# Patient Record
Sex: FEMALE | Race: WHITE
[De-identification: names, ages, dates, MRNs, and addresses within clinical notes are randomized per-mention and may not be internally consistent; named-entity substitution may affect disease eponyms.]

---

## 2017-08-05 ENCOUNTER — HOSPITAL ENCOUNTER (EMERGENCY)
Dept: HOSPITAL 50 - VM.ED | Age: 82
Discharge: ANOTHER HOSPITAL | End: 2017-08-05
Payer: MEDICARE

## 2017-08-05 DIAGNOSIS — G45.9: Primary | ICD-10-CM

## 2017-08-05 DIAGNOSIS — R00.1: ICD-10-CM

## 2017-08-05 LAB
CHLORIDE SERPL-SCNC: 108 MMOL/L (ref 98–109)
SODIUM SERPL-SCNC: 141 MMOL/L (ref 138–146)

## 2017-08-07 NOTE — ER
Date of Service:  08/05/2017

 

SUBJECTIVE:  Lexi presents to the emergency room by EMS.  The patient's

daughter states that the patient has been having episodes of expressive aphasia

and dysarthria for at least a week.  Her other daughter stated that she saw the

patient at approximately 10:30 in the morning, and the patient was found to have

severe dysarthria and expressive aphasia.  The patient's son had seen her at

approximately 9 o'clock that morning and was found to have normal speech pattern

at that time, so her last known well was today between 9 o'clock and 10:30.  EMS

was subsequently summoned, and the patient was transported to the emergency room

as a "green."

 

On arrival to the emergency room, the patient was alert and answering all

questions appropriately, and was not experiencing any confusion.  Her speech

patterns had resolved during transport to the ER.

 

The patient states that she was experiencing a mild headache but denied any

other focal neuro symptoms.

 

PAST MEDICAL HISTORY:  Pituitary tumor.

 

MEDICATIONS:  Please see code green documentation.

 

ALLERGIES:  NKDA.

 

REVIEW OF SYSTEMS:

General:  Denies any fever or chills.

HEENT:  No sore throat, rhinorrhea, congestion.  Does complain of mild

cephalgia.

Respiratory:  No shortness breath.

Cardiac:  Denies any substernal chest pain.  No jaw, arm, neck, or back pain.

GI:  No nausea, vomiting, or diarrhea.  No melena, hematochezia, or hematemesis.

:  Denies any dysuria.

Musculoskeletal:  No myalgias or arthralgias.

Neurologic:  No fainting, blackouts, or lightheadedness.  Again, only complaint

is that of resolved expressive aphasia and dysarthria.

 

PHYSICAL EXAMINATION:

General:  An 84-year-old female patient, who is in no acute distress.

Vital Signs:  Initial blood pressure was approximately 189/100, heart rate 45,

respiratory rate 22, she is afebrile.

Skin:  Warm, pink, and dry.

HEENT:  Eyes are PERRLA.  Extraocular movements are intact.  No funduscopic

papilledema noted.  She does have cataracts noted to the right eye.  Visual

fields are within normal limits.  Ears, TMs are clear.  Mouth, oral mucosa is

moist.  Face is symmetrical.  Eyebrow with normal eyebrow raise and smile.

Lungs:  Clear to auscultation.

Heart:  Regular rate and rhythm.

Abdomen:  Soft, nontender.  There is no hepatosplenomegaly noted.  There are no

masses noted.

Extremities:  Without edema.  She has 5/5 strength in both her upper and lower

extremities.

Neurologic:  Cranial nerves II through XII are intact.  Her speech is fluent

with no dysarthria or expressive aphasia.  Her NIH stroke scale was 0 on

repeated examination.

 

RADIOLOGIC DATA:  CT scan of the patient's brain did not reveal any acute

pathology.

 

LABORATORY DATA:  WBC is 7.6, hemoglobin is 13.7, platelets are 240.  Coags:  PT

is 11.7, INR is 1.1, and PTT is 25.0.  Chemistry:  Sodium is 141, potassium is

3.6, chloride is 108, bicarb is 23, BUN is 16, creatinine is 1.0, GFR is 53,

glucose is 106, calcium is 8.2.  Troponin is 0.0.

 

A 12-lead EKG was obtained showing a sinus bradycardia without any acute ST or T-

wave changes.

 

ASSESSMENT:

1. Transient ischemic attack.

2. Bradycardia.

 

PLAN:  I did speak with Dr. Villanueva of the hospitalist as well as Dr. Grubbs, the

neurologist, at [facility] in [place].  They advised that the patient be

transported to [facility] for further evaluation.  Certainly, she does not

meet criteria for tPA due to her resolving symptoms.  The patient will be

transported by \[facility]\"" ground ambulance.  She will require either a CTA or MRA to

determine if ischemia is the cause of

her dysarthria, however, it could certainly be secondary to the bradycardia as

well.  All questions were answered.

 

 

MWK:  08/07/2017 03:31:25  MODL:  08/07/2017 04:11:48

Job #:  [id number]

## 2017-08-07 NOTE — ER
Date of Service:  08/05/2017

 

SUBJECTIVE:  Lexi presents to the emergency room with expressive aphasia and

dysarthria.  The patient has been experiencing intermittent dysarthria since

Tuesday.  The patient's daughter states that the patient's son checked on her at

approximately 9:30 this morning and the patient was not exhibiting any abnormal

neurological signs or symptoms.  The patient's daughter subsequently came to

[place] and got to the patient's apartment approximately 10:30 this morning.

At that time the patient was experiencing tremor and expressive aphasia and

dysarthria.  The patient's daughter states that she was exhibiting "word salad."

EMS was summoned after some time.  Please refer to the patient's stroke coding

record for arrival time.

 

On arrival to the emergency room, the patient's speech had cleared and she was

no longer experiencing any abnormal verbal signs or symptoms.  The patient

states that she has been feeling poorly for the past several weeks.  She does

complain of headache.  The patient's daughter states that the patient decided to

stop taking her medication as she thought these were making her feel poorly.  It

is unknown how long she has not been taking her medications for.

 

The patient has a history of a pituitary tumor of unknown etiology.  To note, I

do not have access to [facility] to get her past medical history.

 

The patient's daughter states that the patient is not experiencing any facial

droop, difficulty with walking or weakness in her extremities.  Again EMS stated

that her only symptoms were that of an expressive aphasia and dysarthria.

 

PAST MEDICAL HISTORY:

1. Hypertension.

2. Pituitary cancer of unknown etiology.

3. Hypertension.

4. Dyslipidemia.

5. GERD.

 

MEDICATIONS:

1. Furosemide.

2. Benazepril.

3. Metoprolol.

4. Simvastatin.

5. Amlodipine.

6. Omeprazole.

 

ALLERGIES:  NKDA.

 

REVIEW OF SYSTEMS:

Please see history of present illness.

HEENT:  Complains of headache.  Denies any new visual disturbances.  She does

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

Respiratory:  No shortness of breath.

Cardiac:  Denies any substernal chest pain.  No jaw, arm, neck, or back pain.

GI:  No nausea, vomiting, or diarrhea.  No melena, hematochezia, or hematemesis.

:  Denies any dysuria.

Musculoskeletal:  No myalgias or arthralgias.

Neurologic:  Please see history of present illness.

 

PHYSICAL EXAMINATION:

General:  This is an 84-year-old female patient, who is in no acute distress.

Vital signs:  Initial blood pressure was approximately 186/100, heart rate 45,

respiratory rate is 22, she was afebrile, O2 saturation in the mid to high 90s.

Skin:  Warm, pale, and dry.

HEENT:  Ears, her right eye is opacified with what appears to be a cataract.

Ears, TMs are clear.  Mouth, oral mucosa is moist.  Visual fields are within

normal limits.

Neck:  Supple without masses.  There is no lymphadenopathy.

Chest:  No sternal or intercostal retractions noted.

Lungs:  Clear to auscultation.

Heart:  Regular rate and rhythm.

Abdomen:  Soft, nontender.  There is no hepatosplenomegaly or masses noted.

Extremities:  Without edema.

Neurologic:  Cranial nerves II through XII are intact.  Her speech is fluent.

Her gait is within normal limits.  She has no pronator drift.  Facial muscles

are symmetrical.  She has 5/5 strength in both her upper and lower extremities.

NIH stroke scale was performed and rechecked several times and was found to be

0.  A CT scan of the patient's brain was obtained and is pending.

 

LABORATORY DATA:  WBC is 7.6, hemoglobin is 13.7, platelets are 240.  Coags; PT

is 11.7, INR is 1.1, PTT is 25.0.  Sodium is 141, potassium is 3.6, chloride is

108, bicarb is 23, BUN is 16, creatinine is 1.0, GFR is 53, glucose is 106,

calcium is 8.2, troponin is 0.00.

 

EMERGENCY ROOM COURSE:  IV access has been established x2 by EMS.

 

DIAGNOSTIC DATA:  A 12-lead EKG showed a sinus rhythm without any acute ST or T-

wave abnormalities.  Again, the patient's NIH stroke scale remained 0 during her

stay in the emergency room.  She remained stable in my care with no acute

changes.

 

ASSESSMENT:  Transient ischemic attack.

 

PLAN:  The patient will be transferred to [facility] in [place].  I did speak

with the hospitalist as well as Dr. Ledesma, neurologist at [facility] who

accepted the patient in transfer.  The patient will be transported by hospitals ground

ambulance.  The patient does not meet criteria for tPA due to waxing and waning

of her symptoms since Tuesday and due to the fact that the patient's symptoms

are resolving as well as due to her history of pituitary tumor, age, and timing

of her symptoms.  I did discuss the findings with the patient and her family.

The patient identifies herself as a code level 2 with no intubation or

resuscitation.  All questions were answered.

 

 

MWK:  08/05/2017 13:37:28  MODL:  08/05/2017 14:39:09

Job #:  [id number]

## 2018-02-19 ENCOUNTER — HOSPITAL ENCOUNTER (INPATIENT)
Dept: HOSPITAL 50 - VM.MS | Age: 83
Discharge: ANOTHER HOSPITAL | DRG: 683 | End: 2018-02-19
Attending: FAMILY MEDICINE | Admitting: FAMILY MEDICINE
Payer: MEDICARE

## 2018-02-19 DIAGNOSIS — D49.7: ICD-10-CM

## 2018-02-19 DIAGNOSIS — F32.9: ICD-10-CM

## 2018-02-19 DIAGNOSIS — K22.70: ICD-10-CM

## 2018-02-19 DIAGNOSIS — I67.9: ICD-10-CM

## 2018-02-19 DIAGNOSIS — Z88.8: ICD-10-CM

## 2018-02-19 DIAGNOSIS — I12.9: ICD-10-CM

## 2018-02-19 DIAGNOSIS — E11.9: ICD-10-CM

## 2018-02-19 DIAGNOSIS — E87.5: ICD-10-CM

## 2018-02-19 DIAGNOSIS — N17.9: Primary | ICD-10-CM

## 2018-02-19 DIAGNOSIS — N18.3: ICD-10-CM

## 2018-02-19 DIAGNOSIS — E87.2: ICD-10-CM

## 2018-02-19 DIAGNOSIS — Z79.899: ICD-10-CM

## 2018-02-19 DIAGNOSIS — E86.0: ICD-10-CM

## 2018-02-19 LAB
CHLORIDE SERPL-SCNC: 108 MMOL/L (ref 98–107)
CHLORIDE SERPL-SCNC: 108 MMOL/L (ref 98–107)
SODIUM SERPL-SCNC: 134 MMOL/L (ref 136–145)
SODIUM SERPL-SCNC: 134 MMOL/L (ref 136–145)

## 2018-02-19 PROCEDURE — C9113 INJ PANTOPRAZOLE SODIUM, VIA: HCPCS

## 2018-02-19 NOTE — PCM.SN
- Free Text/Narrative


Note: 





Potassium improved to 5.7, bicarb is just being given, HCO3 is still low, Cr 

improved to 2.9, BUN 87. Still feel she needs transfer due to acidosis.

## 2018-02-19 NOTE — PCM.SN
- Free Text/Narrative


Note: 





Informed pt and family that she has BRANDY and we're giving her meds to reverse 

hyperkalemia with D 50 and Insulin, and calcium gluconate, and not use 

kayexelate per new protocol. Pt refused MRI. Will check UA and ABG's. She is ok 

to keep here, but may require transfer if K not improving.

## 2018-02-20 NOTE — DISCH
PRIMARY DIAGNOSES:

1. Acute kidney injury.

2. Metabolic acidosis.

3. Hyperkalemia.

4. Dehydration, multifactorial.

5. Chronic kidney disease stage 3.

6. Hypertension.

7. Depression.

8. Dehydration, moderate.

9. Type 2 diabetes mellitus.

10.Pituitary tumor.

11.Cerebrovascular disease.

12. Haider's esophagits.

 

SUMMARY OF ADMIT HISTORY AND PHYSICAL:  The patient is an 85-year-old female who

presented to the clinic with hypotension, nausea, not eating well.  She says for

the past several months, it has been confusing about her medications, what she

has been taking.  She had missed her appointment in February.  She had been seen

by myself in November, doing well.  She had been seen by Nephrology in January,

doing well.  The patient had probably resumed her spironolactone and doxazosin.

Also, she had stopped her Prilosec, which she had been on for Miranda

esophagitis.  She had lost 12 pounds over the last 2 months' time. Her BP was 76
/48, she was noted to be confused. Her pulse was 44, but it chronically runs 
around 50's. Her son and daughter were present. Her mood was noted to be sad 
and she scored a 27 on her PHQ 9 at the clinic. Patient chose to be DNR/DNI 
status and family heard her wishes. 

Physical exam: pt was extremely weak, sitting forward in a wheelchair,trembling 
hands. Voice weak. Mucous membranes dry. Vision poor in left and none in right 
eye. CV bradycardic, Pulm CTA, ABD Bowel sounds present, soft, mile epigastric 
tenderness. Ext: slender thin, NEURO, oreinted x 3, vague memory, PSYCH, 
Appears sad. Judgement questionable.

 

SUMMARY OF HOSPITAL COURSE:  When initially admitted, it was noted that she was

hyperkalemic at 7.7, with Cr 3.1  Her HCO3 was reduced at 16.  GFR wsa 17. Her 
Hemoglobin was 11.7. She was given normal saline 500 mg as well as D5 normal 
saline at 150 mg/hour.  She was given 10 units of Humulin R with D50 as well as 
calcium

gluconate 1 g.  She was given zofran for her nausea and starIed on IV protonix 
for concern for her Barrets esophagitis.. EKG peaked T waves with inf MI changes
, old, No ST changes. Her aspirin, benazepril, lasix, spironolactone, and 
doxazosin was held, but she had already had her medications today. 



Because of confusion, changing vision with known prior pituitary tumor, I had 
ordered an MRI of her head, but pt refused to have it done.



It was then noted that her pH was reduced at 7.17, pCO2 27, PO2

97, bicarb 10, total carbon dioxide 11, O2 saturation is 96, base excess -19.

The patient's blood pressure did improve to 124/60.  The patient will also be 
given bicarb

per protocol for low fluid intake.to help with her metabolic acidosis. Her 
potassium did improve to 5.8, Cr 2.9, BUN was 87. 



 I did visit with Dr. Robb Burns at 3 p.m at [facility] in [place] to transfer 
patient,due to ongong need for close monitoring of pt with her BRANDY and 
metabolic acidosis .and he does accept the patient; however, bed availability 
may be

a little bit prolonged and so we will see what her repeat basic metabolic 
profile is at 3

p.m. today.Her IV fluids were reduced to avoid overhydration. She was starting 
to feel better. It was felt she needed to be started on an antidepressant once 
her appetite improved. A UA was done which had bacteria, and WBC and 
epitheilial cells. A UC was set up. She was goingi to have a repeat BMP at 18:30
, if the transfer did not take place. Her care was signed out to on call 
provider, Dr Dozier. Pt did leave the facility about 18:00.



She was placed om compression hosiery for DVT prophylaxis. She was not placed 
on pharmaceutical prevention because of BRANDY and concern for risk for falls.





Over 30 minues of time was spent preparing her discharge and transfer to [place].



Her medications at transfer were her protonix 40 mg IV, Zofran 4 mg IV q 6 hrs 
prn, IV D5 NS at 75 ml/hr. 

Below were her admit meds that were held, except for her eye drops





 benazepril (LOTENSIN) 40 mg tablet TAKE 1 TABLET BY MOUTH EVERY DAY 30 tablet 
11

 

 doxazosin (CARDURA) 1 mg tablet TAKE 1 TABLET BY MOUTH EVERY NIGHT AT BEDTIME 
30 tablet 11

 

 omeprazole (PRILOSEC) 20 mg capsule TAKE 1 CAPSULE BY MOUTH ONCE DAILY 30 
capsule 11

 

 metoprolol succinate (TOPROL XL) 100 mg SR tablet (24 hr) Take 1 tablet (100 
mg) by mouth 1 time per day 90 tablet 3

 

 spironolactone (ALDACTONE) 50 mg tablet Take 1 tablet (50 mg) by mouth 1 time 
per day 30 tablet 4

 

 vitamin D2, ergocalciferol, (DRISDOL) 53707 unit capsule Take 1 capsule (50,
000 Units) by mouth 1 time a month 3 capsule 3

 

 furosemide (LASIX) 40 mg tablet Take 1 tablet (40 mg) by mouth 1 time per day 
Weigh daily 45 tablet 5

 

 acetaminophen (TYLENOL) 325 mg tablet Take 2 tablets (650 mg) by mouth every 
6 hours as needed for mild pain  0

 

 aspirin 325 mg tablet Take 1 tablet (325 mg) by mouth 1 time per day 120 
tablet 0

 

 senna-docusate sodium (SENOKOT-S;PERICOLACE) 8.6-50 MG tablet Take 1 tablet 
by mouth 2 times a day as needed for constipation 60 tablet 0

 

 bimatoprost (LUMIGAN) 0.01 % ophthalmic solution Place 1 drop into both eyes 
every night at bedtime ~  

 

 brimonidine-timolol (COMBIGAN) 0.2-0.5 % ophthalmic solution Place 1 drop 
into both eyes 2 times a day ~  

 

 brinzolamide (AZOPT) 1 % ophthalmic suspension Place 1 drop into the left eye 
2 times a day ~  

 

 sodium chloride hypertonic (TRICIA 128) 5 % ophthalmic solution Place 1 drop 
into both eyes as needed for other (Specify) (dry eyes) ~  

 

 Melatonin 10 MG TABS Take 1 tablet by mouth At bedtime as needed (sleep)  

 

 omeprazole (PRILOSEC) 20 mg capsule TAKE 1 CAPSULE BY MOUTH ONCE DAILY 90 
capsule 3







 

 

GM2018 15:02:08  MODL:  2018 04:59:24

Job #:  [id number]

MTDD

## 2018-02-20 NOTE — HP
CHIEF COMPLAINT:  Weakness and nausea.

 

HISTORY OF PRESENT ILLNESS:  The patient is an 85-year-old female, who presented

to the clinic. Family brought her in because they were concerned about her

feeling nauseated, not eating well. She has not been taking her medications

exactly  correct, I last saw her in November. She was doing quite well with her

blood pressure. She had seen Dr. Lange in January, was doing quite well,

however, family had called in December, saying that she was only eating tea and

toast. She has lost 12 pounds since December. There was always a confusion about

her medications. The family member had stopped her Prilosec to "save money." The

patient was on that for Miranda's esophagitis. She had also been taken off her

doxazosin because of problems with peripheral edema and had been resumed on

spironolactone. However, she never had her potassium level recheck and the

patient missed her appointment in early February. The patient has not been very

interested in coming to doctor cares, she feels she was a burden to others. She

has actually been feeling much more down and depressed since . She has

never been on medications for depression before. When seen in the clinic, it was

noted that her blood pressure was low at 76/48, and on , it had been

136/56. Her weight was down to 114 pounds. She is somewhat tremulous. She was

noted to be confused. She is accompanied by her son and daughter. The patient

was admitted immediately to [facility] without any chance to do blood work

before admitting her and she was given IV fluid resuscitation right away of 500

mg normal saline. The patient also was noted to have a pituitary tumor which her

eye doctor was concerned it might be growing, but she refused to have any

surgery and she refuses to have any further MRIs done of this image.

 

MEDICATIONS:  She is currently on aspirin 325 mg 1 pill a day. Benazepril 40 mg

1 pill daily. Lasix 40 mg 1 pill daily. Metoprolol extended release 100 mg 1

pill daily. Spironolactone 50 mg 1 pill daily. Doxazosin 1 mg 1 pill daily (she

has been off her Prilosec 20 mg since probably October and then simvastatin 20

mg was stopped in December because of nausea concerns). She is also on vitamin D

50,000 units 1 pill once a month that was supposed to be started on . She

takes Senokot one pill twice a day as needed. Her Lumigan 1 drop both eyes every

night at bedtime. Combigan 1 drop twice a day. Azopt 1% 1 drop in left eye twice

a day.  René 1 drop in both eyes as needed. Melatonin 10 mg at bedtime.

 

ALLERGIES:  Amlodipine, causes severe fluid retention. Simvastatin, questionably

causes nausea.

 

PAST MEDICAL HISTORY:  The patient has hypertension. She has had benign neoplasm

of her pituitary gland with previous resection of this, hypertension,

hypercholesterolemia, transient neurological deficit in 2015, unclear if it

was related to hypertension versus she had stereotypical episodes of expressive

aphasia. She had an extensive workup with MRI and MRA of her brain. Neurology

consulted, they switched her aspirin from 81 to 325 mg a day. She declined

neurology followup. She has type 2 diabetes mellitus. She has had an adenomatous

colon polyp in . She declined further colonoscopies, last asked in . She

has had Miranda's esophagitis in  by EGD, she had followup refused followups

since then noted in . She has had macular degeneration, malaise, fatigue,

and vitamin D deficiency.

 

PAST SURGICAL HISTORY:  She has had pituitary tumor resected, I am not certain

of the date.

 

FAMILY MEDICAL HISTORY:  Mother unknown history. Father is not known. Son has a

problem with some alcohol use.

 

SOCIAL HISTORY:  The patient is . She has been a housewife. She does not

smoke. She rarely consumes alcohol.

 

REVIEW OF SYSTEMS:

She has lost weight. She has tremulousness. She has very poor vision in her

eyes. No coughing. No shortness of breath. She denies nausea. She has been

somewhat sick to her stomach at times. She does walk unassisted. No numbness or

tingling in her feet. She feels sad, down, depressed, does not sleep well, does

not eat well. No skin lesions. No headaches. She has vomited. She has been a

little bit confused, forgetful. No problems with bruising or bleeding. No black

tarry stools. No diarrhea. No burning with urination. No headaches.

 

PHYSICAL EXAMINATION:

General: Reveals markedly ill-appearing female, sitting in a wheelchair, bent

over.

Vital signs: Her weight is 114, blood pressure is 76/48, temperature is 97,

pulse 48, and sats 100.

Skin: Pink, warm, and dry. Mucous membranes are extremely dry.

Eyes: Her conjunctivae are dry.

Heart: Regular rate and rhythm.

Lungs: Clear to auscultation.

Abdomen: Bowel sounds are present, soft. She has some minimal epigastric

tenderness.

Extremities: Slender and thin, 

Neruologic;she is very weak.She has no vision in her right eye. Reduced vision 
in her left. Oriented x 3. Memory is vague.

Psych: appears sad, poor eye contact.

 

IMPRESSION:

1. Acute hypotension.

2. Dehydration.

3. Confusion.

4. Chronic kidney disease.

5. Type 2 diabetes mellitus.

6. Pituitary tumor  history of.

7. Cerebrovascular disease with history of transient ischemic attack.

8. History of Miranda's esophagitis.

9. Depression.

 

PLAN:  The patient will be admitted to acute care. She will be given IV

hydration. We will watch her blood sugars. We will hold many of her blood

pressure medicines right now until she improves. We will have her on telemetry.

I did talk with the patient about considering MRI to just make certain she did

not have any further stroke like change of her head. She was not very

interested, but she consider it, but she does not want to consider any sort of

pituitary tumor.She will be given zofran for nausea and she'll be placed on IV 
protonix for her stomach.

 The patient with her mood is felt to be depressed most likely

she will need to be started on antidepressant as her PHQ score was 27. The

patient does not want to be in resuscitated or intubated, so she is DNR/DNI.

 

ADDENDUM:  After the patient was admitted, her lab work came back showing that

her white blood cell count was 7.3, hemoglobin 11.7, platelets are 165. Her

potassium was 7.7, sodium 134, creatinine 3.1, BUN 90, carbon dioxide 15,

chloride 108, glucose 120, calcium 8.7. LFTs were normal with AST 7, ALT 11,

alkaline phosphatase 85. Troponin less than 0.01. Albumin 2.9, amylase 49,

lipase was 304, which is within normal range, normal is 73 to 393. EKG showed

sinus bradycardia, then blood gases were done which came back at approximately

0230 which showed pH 7.17, pCO2 27, PO2 97, bicarb 10, total CO2 11, O2 sats 96,

base excess -19, FiO2 21. The patient refused an MRI of her head.



1. Acute kidney injury.

2. Hyperkalemia.

3. Metabolic acidosis. I did visit with family and they do concur about having

    the patient to be transferred to a higher level of care and arrangements to

    be made. To note, the patient was not placed on Lovenox because of risk of

    falls as well as not knowing her renal status at the time of initial

    admission. The patient does agree to go.



Initial medications of 500 normal saline, D5 normal saline running at 150

mg/hour. She had been then given 10 units of Humulin R as well as an ampule of

D50 and calcium gluconate 1 g to help with her hyperkalemia. She had already had

her medications today, but her benazepril will be held and her doxazosin will be

held and spironolactone will be held, the intent was that she would be started

on Prozac 10 mg tomorrow. A urinalysis will also be done.

 

 

GM2018 14:53:45  MODL:  2018 21:04:44

Job #:  [id number]

MTDD

## 2019-02-13 ENCOUNTER — HOSPITAL ENCOUNTER (EMERGENCY)
Dept: HOSPITAL 50 - VM.ED | Age: 84
Discharge: ANOTHER HOSPITAL | End: 2019-02-13
Payer: MEDICARE

## 2019-02-13 DIAGNOSIS — I10: ICD-10-CM

## 2019-02-13 DIAGNOSIS — Z79.82: ICD-10-CM

## 2019-02-13 DIAGNOSIS — S72.141A: Primary | ICD-10-CM
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
## 2019-02-13 DIAGNOSIS — E11.9: ICD-10-CM

## 2019-02-13 DIAGNOSIS — Z88.8: ICD-10-CM

## 2019-02-13 LAB
ANION GAP SERPL CALC-SCNC: 18.4 MMOL/L (ref 10–20)
CHLORIDE SERPL-SCNC: 106 MMOL/L (ref 98–107)
SODIUM SERPL-SCNC: 143 MMOL/L (ref 136–145)

## 2019-02-13 PROCEDURE — 85610 PROTHROMBIN TIME: CPT

## 2019-02-13 PROCEDURE — 85025 COMPLETE CBC W/AUTO DIFF WBC: CPT

## 2019-02-13 PROCEDURE — 84484 ASSAY OF TROPONIN QUANT: CPT

## 2019-02-13 PROCEDURE — 99285 EMERGENCY DEPT VISIT HI MDM: CPT

## 2019-02-13 PROCEDURE — 80053 COMPREHEN METABOLIC PANEL: CPT

## 2019-02-13 PROCEDURE — 96376 TX/PRO/DX INJ SAME DRUG ADON: CPT

## 2019-02-13 PROCEDURE — 36415 COLL VENOUS BLD VENIPUNCTURE: CPT

## 2019-02-13 PROCEDURE — 72192 CT PELVIS W/O DYE: CPT

## 2019-02-13 PROCEDURE — 73030 X-RAY EXAM OF SHOULDER: CPT

## 2019-02-13 PROCEDURE — 96361 HYDRATE IV INFUSION ADD-ON: CPT

## 2019-02-13 PROCEDURE — 96374 THER/PROPH/DIAG INJ IV PUSH: CPT

## 2019-02-13 NOTE — CT
______________________________________________________________________________   

  

[id number] CT/CT Pelvis WO IV  

Exam:   

   

 CT Pelvis WO IV  

   

 Clinical Data:   

   

 TRAUMA  

   

 COMPARISON:   

   

 NO PREVIOUS SIMILAR EXAMS ARE AVAILABLE  

   

 FINDINGS:   

   

 There is a small hematoma in the right iliac fossa.  

   

 There is a comminuted intertrochanteric fracture of the right hip.  

   

 The pubic rami appear to be intact.  

   

 IV contrast was not used.  

   

 There is avulsion of the lesser and greater trochanters of the right hip.  

   

 The sacroiliac joints and sacrum appear to be intact.  

   

 There are degenerative changes of the lower lumbar spine.  

   

 There are diffuse atheromatous calcifications.   

   

 IMPRESSION:  

   

 COMPLEX RIGHT SIDE HIP FRACTURE.  

   

 RIGHT ILIAC FOSSA HEMATOMA SUGGESTING HEMORRHAGE   

   

 RELATED TO THE RIGHT HIP FRACTURE.  

   

 NO OBVIOUS PELVIC FRACTURE SEEN.  

   

 SLIGHT DIASTASES OF THE RIGHT SI JOINT MAY BE COEXISTING TO  

   

 EXPLAIN THE RIGHT SIDE PELVIC HEMORRHAGE.  

   

 FOLLOW-UP WITH IV CONTRAST MAY BE HELPFUL.  

   

 Electronically signed by John Young MD on 2/13/2019 8:47 PM  

   

  

John Young MD                 

 02/13/19 9733    

  

Thank you for allowing us to participate in the care of your patient.

## 2019-02-13 NOTE — EDM.PDOC
ED HPI GENERAL MEDICAL PROBLEM





- General


Chief Complaint: Lower Extremity Injury/Pain


Stated Complaint: Right hip pain, Left shoulder pain, fall


Time Seen by Provider: 02/13/19 19:38


Source of Information: Reports: Patient, EMS


History Limitations: Reports: No Limitations





- History of Present Illness


INITIAL COMMENTS - FREE TEXT/NARRATIVE: 


Patient lives in her own apartment and EMS was called out for a fall with right 

hip pain as well as left shoulder pain.  She denies any head injury, LOC, chest 

pain, shortness of breath.  Denies abdominal pain, denies blood in urine or 

stool.  No nausea or vomiting.  She does have bruising to her left shoulder.  

She tells us she has been falling at home.  


Onset: Today, Sudden


Location: Reports: Upper Extremity, Left, Lower Extremity, Right


Quality: Reports: Ache, Sharp


Severity: Moderate


Worsens with: Reports: Movement


Associated Symptoms: Reports: No Other Symptoms


  ** Right hip


Pain Score (Numeric/FACES): 10





- Related Data


 Allergies











Allergy/AdvReac Type Severity Reaction Status Date / Time


 


amlodipine Allergy  Other Verified 02/13/19 20:45


 


simvastatin Allergy  Other Verified 02/13/19 20:45











Home Meds: 


 Home Meds





Acetaminophen [Tylenol] 650 mg PO Q4H PRN  tablet 02/19/18 [Rx]


Acetaminophen [Tylenol] 650 mg PO Q6H PRN 02/19/18 [History]


Aspirin [Ecotrin] 325 mg PO DAILY 02/19/18 [History]


Benazepril [Lotensin] 40 mg PO DAILY 02/19/18 [History]


Bimatoprost [LUMIGAN 0.01% Ophth Soln] 1 drop EYEBOTH BEDTIME 02/19/18 [History]


Brimonidine Tartrate/Timolol [Combigan 0.2%-0.5% Eye Drops] 1 drop EYEBOTH 

BEDTIME 02/19/18 [History]


Brinzolamide [Azopt 1% Ophth Susp] 1 drop EYELF DAILY 02/19/18 [History]


Dextrose 10% in Water 500 ml IV ASDIRECTED  bag 02/19/18 [Rx]


Dextrose 5%-0.9% NaCl [Dextrose 5%-Normal Saline] 150 ml IV ASDIRECTED  bag 02/ 19/18 [Rx]


Doxazosin Mesylate [Cardura] 1 mg PO BEDTIME 02/19/18 [History]


Ergocalciferol (Vitamin D2) [Vitamin D2] 1 cap PO Q30D 02/19/18 [History]


Furosemide [Lasix] 40 mg PO DAILY 02/19/18 [History]


Melatonin 10 mg PO BEDTIME PRN 02/19/18 [History]


Metoprolol Succinate [Toprol XL 100mg] 100 mg PO DAILY 02/19/18 [History]


Omeprazole [priLOSEC OTC] 20 mg PO DAILY 02/19/18 [History]


Ondansetron [Zofran] 4 mg IVPUSH Q6H PRN  vial 02/19/18 [Rx]


Pantoprazole [ProTONIX IV***] 40 mg IVPUSH DAILY  vial 02/19/18 [Rx]


Sennosides/Docusate Sodium [Senna-S Tablet] 1 each PO BID PRN 02/19/18 [History]


Sodium Chloride 5% [Robert 128 5% Ophth Soln] 1 drop EYELF DAILY PRN 02/19/18 [

History]


Spironolactone [Aldactone] 50 mg PO DAILY 02/19/18 [History]











Past Medical History


HEENT History: Reports: Macular Degeneration


Cardiovascular History: Reports: High Cholesterol, Hypertension


Other Neuro History: transient neurologic deficit


Endocrine/Metabolic History: Reports: Diabetes, Type II, Vitamin D Deficiency





Social & Family History





- Family History


Family Medical History: Noncontributory





Review of Systems





- Review of Systems


Review Of Systems: See Below


Constitutional: Reports: No Symptoms


Eyes: Reports: No Symptoms


Ears: Reports: No Symptoms


Nose: Reports: No Symptoms


Mouth/Throat: Reports: No Symptoms


Respiratory: Reports: No Symptoms


Cardiovascular: Reports: No Symptoms


GI/Abdominal: Reports: No Symptoms


Genitourinary: Reports: No Symptoms


Musculoskeletal: Reports: Shoulder Pain (left), Leg Pain (right hip)


Skin: Reports: Bruising (bruising in multiple stages of healing to shoulder, 

arms, legs)


Neurological: Reports: No Symptoms


Psychiatric: Reports: No Symptoms





ED EXAM, GENERAL





- Physical Exam


Exam: See Below


Exam Limited By: No Limitations


General Appearance: Alert, WD/WN, Mild Distress


Eye Exam: Bilateral Eye: EOMI


Ears: Normal TMs


Nose: Normal Inspection, Normal Mucosa, No Blood


Throat/Mouth: Normal Inspection, Normal Lips, Normal Teeth, Normal Gums, Normal 

Oropharynx, Normal Voice, No Airway Compromise


Head: Atraumatic, Normocephalic


Neck: Normal Inspection, Supple, Non-Tender, Full Range of Motion


Respiratory/Chest: No Respiratory Distress, Lungs Clear, Normal Breath Sounds, 

No Accessory Muscle Use, Chest Non-Tender


Cardiovascular: Normal Peripheral Pulses, Regular Rate, Rhythm, No Edema, No 

Gallop, No JVD, No Murmur, No Rub


Peripheral Pulses: 2+: Posterior Tibial (L), Posterior Tibial (R), Dorsalis 

Pedis (L), Dorsalis Pedis (R)


GI/Abdominal: Normal Bowel Sounds, Soft, Non-Tender, No Organomegaly, No 

Distention, No Abnormal Bruit, No Mass


Back Exam: Normal Inspection, Full Range of Motion, NT


Extremities: Leg Pain (right hip shortening, rotation.  Pain with movement, 

internal and external rotation), Limited Range of Motion.  No: Normal Inspection


Neurological: Alert, Oriented, CN II-XII Intact, Normal Cognition, Normal Gait, 

Normal Reflexes, No Motor/Sensory Deficits


Psychiatric: Normal Affect, Normal Mood


Skin Exam: Ecchymosis (left shoulder, left leg, right leg, arms bilateral)





Course





- Vital Signs


Last Recorded V/S: 


 Last Vital Signs











Temp  37.7 C   02/13/19 19:30


 


Pulse  100   02/13/19 19:30


 


Resp  16   02/13/19 19:30


 


BP  176/82 H  02/13/19 19:30


 


Pulse Ox  98   02/13/19 19:30














- Orders/Labs/Meds


Orders: 


 Active Orders 24 hr











 Category Date Time Status


 


 Sodium Chloride 0.9% [Normal Saline] 1,000 ml Med  02/13/19 19:45 Ordered





 IV ASDIRECTED   


 


 Sodium Chloride 0.9% [Saline Flush] Med  02/13/19 19:42 Ordered





 10 ml FLUSH ASDIRECTED PRN   


 


 Saline Lock Insert [OM.PC] Routine Oth  02/13/19 19:42 Ordered








 Medication Orders





Sodium Chloride (Normal Saline)  1,000 mls @ 200 mls/hr IV ASDIRECTED ARNOLD


   Last Admin: 02/13/19 20:30  Dose: 200 mls/hr


Sodium Chloride (Saline Flush)  10 ml FLUSH ASDIRECTED PRN


   PRN Reason: Keep Vein Open








Labs: 


 Laboratory Tests











  02/13/19 02/13/19 02/13/19 Range/Units





  20:19 20:19 20:19 


 


WBC  14.5 H    (4.0-10.0)  x10^3/uL


 


RBC  3.48 L    (4.00-5.50)  x10^6/uL


 


Hgb  10.4 L    (12.0-16.0)  g/dL


 


Hct  31.9 L    (33.0-47.0)  %


 


MCV  91.7    (78.0-93.0)  fL


 


MCH  29.9    (26.0-32.0)  pg


 


MCHC  32.6    (32.0-36.0)  g/dL


 


RDW Coeff of Raegan  14.7    (10.0-15.0)  %


 


Plt Count  217    (130-400)  x10^3/uL


 


Neut % (Auto)  84.1 H    (50.0-80.0)  %


 


Lymph % (Auto)  8.4 L    (25.0-50.0)  %


 


Mono % (Auto)  7.3    (2.0-11.0)  %


 


Eos % (Auto)  0.0    (0.0-4.0)  %


 


Baso % (Auto)  0.2    (0.2-1.2)  %


 


PT   12.7 H   (9.6-11.4)  SEC


 


INR   1.2 L   (2.0-3.5)  


 


Sodium    143  (136-145)  mmol/L


 


Potassium    4.4  (3.5-5.1)  mmol/L


 


Chloride    106  ()  mmol/L


 


Carbon Dioxide    23  (21-32)  mmol/L


 


Anion Gap    18.4  (10-20)  mmol/L


 


BUN    29 H D  (7-18)  mg/dL


 


Creatinine    1.4 H D  (0.55-1.02)  mg/dL


 


Est Cr Clr Drug Dosing    TNP  


 


Estimated GFR (MDRD)    36  


 


Glucose    180 H  ()  mg/dL


 


Calcium    9.2  (8.5-10.1)  mg/dL


 


Corrected Calcium    9.76  (8.5-10.1)  mg/dL


 


Total Bilirubin    0.7  (0.2-1.0)  mg/dL


 


AST    22  (15-37)  U/L


 


ALT    18  (14-59)  U/L


 


Alkaline Phosphatase    100  ()  U/L


 


Troponin I    < 0.017  (<=0.056)  ng/mL


 


Total Protein    6.5  (6.4-8.2)  g/dL


 


Albumin    3.3 L  (3.4-5.0)  g/dL


 


Globulin    3.2  


 


Albumin/Globulin Ratio    1.03  











Meds: 


Medications











Generic Name Dose Route Start Last Admin





  Trade Name Freremigio  PRN Reason Stop Dose Admin


 


Sodium Chloride  1,000 mls @ 200 mls/hr  02/13/19 19:45  02/13/19 20:30





  Normal Saline  IV   200 mls/hr





  ASDIRECTED ARNOLD   Administration





     





     





     





     


 


Sodium Chloride  10 ml  02/13/19 19:42  





  Saline Flush  FLUSH   





  ASDIRECTED PRN   





  Keep Vein Open   





     





     





     














Discontinued Medications














Generic Name Dose Route Start Last Admin





  Trade Name Freq  PRN Reason Stop Dose Admin


 


Hydromorphone HCl  0.5 mg  02/13/19 19:53  02/13/19 20:15





  Dilaudid  IVPUSH  02/13/19 19:54  0.5 mg





  ONETIME ONE   Administration





     





     





     





     


 


Hydromorphone HCl  0.5 mg  02/13/19 21:27  02/13/19 21:30





  Dilaudid  IVPUSH  02/13/19 21:28  0.5 mg





  ONETIME ONE   Administration





     





     





     





     














- Radiology Interpretation


Free Text/Narrative:: 


Shoulder x-ray negative for any fractures





CT pelvis shows Comminuted intertrochanteric fracture of the right hip





Departure





- Departure


Time of Disposition: 21:40


Disposition: DC/[facility]fer to [facility]


Condition: Fair


Clinical Impression: 


 Closed right hip fracture








- Discharge Information


Referrals: 


Kassandra Guevara MD [Primary Care Provider] - 


Forms:  ED Department Discharge, Interfacility Transfer [facility]





ED Communication





- ED Communication Date/Time


Date: 02/13/19


Time Called: 21:00





- Discussed Case With (1)


Discussed Case With (1): Admitting Provider (Dr. Kirby hospitalist given report 

and did accept patient.  Await transfer)





- My Orders


Last 24 Hours: 


My Active Orders





02/13/19 19:42


Sodium Chloride 0.9% [Saline Flush]   10 ml FLUSH ASDIRECTED PRN 


Saline Lock Insert [OM.PC] Routine 





02/13/19 19:45


Sodium Chloride 0.9% [Normal Saline] 1,000 ml IV ASDIRECTED 














- Assessment/Plan


Last 24 Hours: 


My Active Orders





02/13/19 19:42


Sodium Chloride 0.9% [Saline Flush]   10 ml FLUSH ASDIRECTED PRN 


Saline Lock Insert [OM.PC] Routine 





02/13/19 19:45


Sodium Chloride 0.9% [Normal Saline] 1,000 ml IV ASDIRECTED

## 2019-02-13 NOTE — CR
______________________________________________________________________________   

  

[id number] RAD/RAD Shoulder Left 2V Min  

EXAM:   

   

 RAD Shoulder Left 2V Min  

   

 CLINICAL DATA:   

   

 TRAUMA  

   

 COMPARISON:   

   

 NO PREVIOUS SIMILAR EXAM IS AVAILABLE.  

   

 FINDINGS:   

   

 No fracture or dislocation is seen.  

   

 There is no radiopaque foreign body in the soft tissues.  

   

 There is no air in the soft tissues.  

   

 There is no cortical thickening or periosteal reaction either.  

   

 IMPRESSION:  

   

 NEGATIVE PLAIN FILM EXAM.  

   

 Electronically signed by John Young MD on 2/13/2019 8:48 PM  

   

  

John Young MD                 

 02/13/19 1109    

  

Thank you for allowing us to participate in the care of your patient.

## 2019-04-05 ENCOUNTER — HOSPITAL ENCOUNTER (OUTPATIENT)
Dept: HOSPITAL 50 - VM.ED | Age: 84
Setting detail: OBSERVATION
LOS: 1 days | Discharge: NURSING, REHAB OR LONG-TERM CARE | End: 2019-04-06
Attending: NURSE PRACTITIONER | Admitting: NURSE PRACTITIONER
Payer: MEDICARE

## 2019-04-05 DIAGNOSIS — E78.00: ICD-10-CM

## 2019-04-05 DIAGNOSIS — I10: ICD-10-CM

## 2019-04-05 DIAGNOSIS — Z88.8: ICD-10-CM

## 2019-04-05 DIAGNOSIS — E11.9: ICD-10-CM

## 2019-04-05 DIAGNOSIS — R11.2: ICD-10-CM

## 2019-04-05 DIAGNOSIS — E86.0: Primary | ICD-10-CM

## 2019-04-05 DIAGNOSIS — Z79.899: ICD-10-CM

## 2019-04-05 LAB
ANION GAP SERPL CALC-SCNC: 19.7 MMOL/L (ref 10–20)
CHLORIDE SERPL-SCNC: 99 MMOL/L (ref 98–107)
SODIUM SERPL-SCNC: 132 MMOL/L (ref 136–145)

## 2019-04-05 NOTE — EDM.PDOC
ED HPI GENERAL MEDICAL PROBLEM





- General


Chief Complaint: General


Stated Complaint: nausea 


Time Seen by Provider: 04/05/19 15:20


Source of Information: Reports: Patient, EMS, EMS Notes Reviewed, Family, RN


History Limitations: Reports: Other





- History of Present Illness


INITIAL COMMENTS - FREE TEXT/NARRATIVE: 





Patient comes into the emergency department with weakness, nausea and abnormal 

labs results drawn in the clinic. Patient was brought in by EMS after receiving 

critical labs values to the nursing home. Patient was recently started on 

antidepressant approximate 5 days ago. She was also started on Bactrim 5 days 

ago. The reason for the Bactrim was she had UTI symptoms. Family state that 

after starting both of those prescriptions she started having nausea and 

vomiting has not been able to keep any food down for the most part over the 

course the last 4-5 days. Patient is also experiencing increased weakness and 

fatigue. Pt denies any pain. Family can not think of anything that makes it 

better or worse.  


Onset: Gradual


Severity: Moderate


Improves with: Reports: None


Worsens with: Reports: None


Associated Symptoms: Reports: Loss of Appetite, Malaise, Nausea/Vomiting, 

Weakness





- Related Data


 Allergies











Allergy/AdvReac Type Severity Reaction Status Date / Time


 


amlodipine Allergy  Other Verified 04/05/19 17:48


 


simvastatin Allergy  Other Verified 04/05/19 17:48











Home Meds: 


 Home Meds





Acetaminophen [Tylenol] 650 mg PO Q4H PRN  tablet 02/19/18 [Rx]


Acetaminophen [Tylenol] 650 mg PO Q6H PRN 02/19/18 [History]


Aspirin [Ecotrin] 325 mg PO DAILY 02/19/18 [History]


Benazepril [Lotensin] 40 mg PO DAILY 02/19/18 [History]


Bimatoprost [LUMIGAN 0.01% Ophth Soln] 1 drop EYEBOTH BEDTIME 02/19/18 [History]


Brimonidine Tartrate/Timolol [Combigan 0.2%-0.5% Eye Drops] 1 drop EYEBOTH 

BEDTIME 02/19/18 [History]


Brinzolamide [Azopt 1% Ophth Susp] 1 drop EYELF DAILY 02/19/18 [History]


Dextrose 10% in Water 500 ml IV ASDIRECTED  bag 02/19/18 [Rx]


Dextrose 5%-0.9% NaCl [Dextrose 5%-Normal Saline] 150 ml IV ASDIRECTED  bag 02/ 19/18 [Rx]


Doxazosin Mesylate [Cardura] 1 mg PO BEDTIME 02/19/18 [History]


Ergocalciferol (Vitamin D2) [Vitamin D2] 1 cap PO Q30D 02/19/18 [History]


Furosemide [Lasix] 40 mg PO DAILY 02/19/18 [History]


Melatonin 10 mg PO BEDTIME PRN 02/19/18 [History]


Metoprolol Succinate [Toprol XL 100mg] 100 mg PO DAILY 02/19/18 [History]


Omeprazole [priLOSEC OTC] 20 mg PO DAILY 02/19/18 [History]


Ondansetron [Zofran] 4 mg IVPUSH Q6H PRN  vial 02/19/18 [Rx]


Pantoprazole [ProTONIX IV***] 40 mg IVPUSH DAILY  vial 02/19/18 [Rx]


Sennosides/Docusate Sodium [Senna-S Tablet] 1 each PO BID PRN 02/19/18 [History]


Sodium Chloride 5% [Robert 128 5% Ophth Soln] 1 drop EYELF DAILY PRN 02/19/18 [

History]


Spironolactone [Aldactone] 50 mg PO DAILY 02/19/18 [History]











Past Medical History


HEENT History: Reports: Macular Degeneration


Cardiovascular History: Reports: High Cholesterol, Hypertension


Other Neuro History: transient neurologic deficit


Endocrine/Metabolic History: Reports: Diabetes, Type II, Vitamin D Deficiency





Social & Family History





- Family History


Family Medical History: Noncontributory





ED ROS GENERAL





- Review of Systems


Review Of Systems: See Below


Constitutional: Reports: Malaise, Weakness, Fatigue, Decreased Appetite


HEENT: Reports: No Symptoms


Respiratory: Reports: No Symptoms


Cardiovascular: Reports: No Symptoms


Endocrine: Reports: No Symptoms


: Reports: Frequency, Hematuria, Urgency


Musculoskeletal: Reports: No Symptoms


Skin: Reports: No Symptoms


Neurological: Reports: No Symptoms





ED EXAM, GENERAL





- Physical Exam


Exam: See Below


Exam Limited By: No Limitations


General Appearance: Alert, WD/WN, No Apparent Distress


Respiratory/Chest: No Respiratory Distress, Lungs Clear, Normal Breath Sounds, 

No Accessory Muscle Use, Chest Non-Tender


GI/Abdominal: Normal Bowel Sounds, Soft, Non-Tender, No Distention


Back Exam: Normal Inspection, Full Range of Motion


Extremities: Normal Inspection, Normal Range of Motion, Non-Tender, No Pedal 

Edema, Normal Capillary Refill


Neurological: Alert, Oriented


Skin Exam: Warm, Dry, Intact, Pallor





Course





- Orders/Labs/Meds


Orders: 


 Active Orders 24 hr











 Category Date Time Status


 


 Cardiac Monitoring [RC] .AS DIRECTED Care  04/05/19 15:29 Active


 


 EKG Documentation Completion [RC] STAT Care  04/05/19 15:28 Active


 


 Sodium Chloride 0.9% [Normal Saline] 1,000 ml Med  04/05/19 15:29 Active





 IV ONETIME   


 


 Sodium Chloride 0.9% [Saline Flush] Med  04/05/19 15:28 Active





 10 ml FLUSH ASDIRECTED PRN   


 


 Peripheral IV Insertion Adult [OM.PC] Stat Oth  04/05/19 15:28 Ordered








 Medication Orders





Sodium Chloride (Normal Saline)  1,000 mls @ 250 mls/hr IV ONETIME ONE


   Stop: 04/05/19 19:28


   Last Admin: 04/05/19 15:39  Dose: 250 mls/hr


Sodium Chloride (Saline Flush)  10 ml FLUSH ASDIRECTED PRN


   PRN Reason: Keep Vein Open








Labs: 


 Laboratory Tests











  04/05/19 04/05/19 Range/Units





  15:30 15:30 


 


WBC  10.9 H   (4.0-10.0)  x10^3/uL


 


RBC  3.76 L   (4.00-5.50)  x10^6/uL


 


Hgb  11.0 L   (12.0-16.0)  g/dL


 


Hct  34.3   (33.0-47.0)  %


 


MCV  91.2   (78.0-93.0)  fL


 


MCH  29.3   (26.0-32.0)  pg


 


MCHC  32.1   (32.0-36.0)  g/dL


 


RDW Coeff of Raegan  18.8 H   (10.0-15.0)  %


 


Plt Count  244   (130-400)  x10^3/uL


 


Add Manual Diff  Yes   


 


Neutrophils % (Manual)  64   (50-80)  %


 


Lymphocytes % (Manual)  14 L   (25-50)  %


 


Reactive Lymphs %  10 H   (0)  %


 


Monocytes % (Manual)  10   (2-11)  %


 


Eosinophils % (Manual)  2   (0-4)  %


 


Vacuolated Monocytes  Rare   


 


Platelet Estimate  Adequate   


 


Anisocytosis  1+ slight H   


 


Ovalocytes  1+ slight H   


 


Rouleaux  1+ slight H   


 


Sodium   132 L D  (136-145)  mmol/L


 


Potassium   5.7 H  (3.5-5.1)  mmol/L


 


Chloride   99  ()  mmol/L


 


Carbon Dioxide   19 L  (21-32)  mmol/L


 


Anion Gap   19.7  (10-20)  mmol/L


 


BUN   63 H D  (7-18)  mg/dL


 


Creatinine   3.1 H* D  (0.55-1.02)  mg/dL


 


Est Cr Clr Drug Dosing   TNP  


 


Estimated GFR (MDRD)   14  


 


Glucose   131 H  ()  mg/dL


 


Calcium   9.7  (8.5-10.1)  mg/dL


 


Corrected Calcium   10.10  (8.5-10.1)  mg/dL


 


Total Bilirubin   0.3  (0.2-1.0)  mg/dL


 


AST   13 L  (15-37)  U/L


 


ALT   17  (14-59)  U/L


 


Alkaline Phosphatase   115  ()  U/L


 


NT-Pro-B Natriuret Pep   565 H  (<=450)  pg/mL


 


Total Protein   7.1  (6.4-8.2)  g/dL


 


Albumin   3.5  (3.4-5.0)  g/dL


 


Globulin   3.6  


 


Albumin/Globulin Ratio   0.97  











Meds: 


Medications











Generic Name Dose Route Start Last Admin





  Trade Name Freq  PRN Reason Stop Dose Admin


 


Sodium Chloride  1,000 mls @ 250 mls/hr  04/05/19 15:29  04/05/19 15:39





  Normal Saline  IV  04/05/19 19:28  250 mls/hr





  ONETIME ONE   Administration





     





     





     





     


 


Sodium Chloride  10 ml  04/05/19 15:28  





  Saline Flush  FLUSH   





  ASDIRECTED PRN   





  Keep Vein Open   





     





     





     














Departure





- Departure


Time of Disposition: 17:45


Disposition: Refer to Observation


Condition: Good


Clinical Impression: 


 Elevated serum creatinine, Dehydration





Nausea and vomiting


Qualifiers:


 Vomiting type: unspecified Vomiting Intractability: non-intractable Qualified 

Code(s): R11.2 - Nausea with vomiting, unspecified








- Discharge Information


*PRESCRIPTION DRUG MONITORING PROGRAM REVIEWED*: Not Applicable


*COPY OF PRESCRIPTION DRUG MONITORING REPORT IN PATIENT JANES: Not Applicable


Forms:  ED Department Discharge





- Problem List Review


Problem List Initiated/Reviewed/Updated: Yes





- My Orders


Last 24 Hours: 


My Active Orders





04/05/19 15:28


EKG Documentation Completion [RC] STAT 


Sodium Chloride 0.9% [Saline Flush]   10 ml FLUSH ASDIRECTED PRN 


Peripheral IV Insertion Adult [OM.PC] Stat 





04/05/19 15:29


Cardiac Monitoring [RC] .AS DIRECTED 


Sodium Chloride 0.9% [Normal Saline] 1,000 ml IV ONETIME 














- Assessment/Plan


Admission H&P: Please use this note as an admission H&P


Last 24 Hours: 


My Active Orders





04/05/19 15:28


EKG Documentation Completion [RC] STAT 


Sodium Chloride 0.9% [Saline Flush]   10 ml FLUSH ASDIRECTED PRN 


Peripheral IV Insertion Adult [OM.PC] Stat 





04/05/19 15:29


Cardiac Monitoring [RC] .AS DIRECTED 


Sodium Chloride 0.9% [Normal Saline] 1,000 ml IV ONETIME 











Assessment:: 





1. nausea and vomiting 


2. dehydration 


3. elevated creatinine 


4. Medication reaction 


Plan: 





1. Labs completed in ER. results discussed with the pt and family  


2. Fluid bolus given in ER 


3. CT scan ordered- negative results. results discussed with the pt and family  


4. Pt had a UA completed early in the week did not repeat in the ER. She no 

longer has symptoms.  


5. Pt does not wish to go to [place]. She would like to be admitted and monitor 

over night. 


6. Pt is feeling better with fluids. Denies any nausea


7. Pt will be admitted for observation and will be given IV fluids over night 

and recheck labs in the am


8. Pt will be given a dose of Rocephin 1gm IV and Bactrim will be stopped for 

intolerance


9. Labs will be re-evaluated in the am.

## 2019-04-05 NOTE — CT
______________________________________________________________________________   

  

[id number] CT/CT Abdomen Pelvis WO IV  

EXAM: CT Abdomen Pelvis WO IV  

   

 CLINICAL DATA: ELEVATED CR AND UTI.  

   

 COMPARISON STUDY: None.  

   

 FINDINGS:  

   

 Coronary artery disease.   

   

 Liver, spleen, gallbladder, pancreas, and adrenal glands are unremarkable.  

   

 The kidneys are mildly atrophic. No renal calculi. No hydronephrosis or  

 hydroureter. 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  

   

  No bowel obstruction or inflammation.   

   

 No lymphadenopathy, free fluid, or pneumoperitoneum.   

   

 Atherosclerotic calcifications of the aorta and its branches. Scattered changes  

 of spondylosis the spine. No fracture or osseous lesion. Postsurgical changes of  

 the right femur.  

   

 IMPRESSION:  

   

 No evidence of obstructing stone within the urinary tract bilaterally.  

   

 Electronically signed by Nam Coe MD on 4/5/2019 5:31 PM  

   

  

Nam Coe DO                 

 04/05/19 1733    

  

Thank you for allowing us to participate in the care of your patient.

## 2019-04-06 LAB
ANION GAP SERPL CALC-SCNC: 17.3 MMOL/L (ref 10–20)
CHLORIDE SERPL-SCNC: 109 MMOL/L (ref 98–107)
SODIUM SERPL-SCNC: 142 MMOL/L (ref 136–145)

## 2019-04-06 NOTE — PCM.DCSUM1
**Discharge Summary





- Hospital Course


Free Text/Narrative:: 





ER HPI:


Patient comes into the emergency department with weakness, nausea and abnormal 

labs results drawn in the clinic. Patient was brought in by EMS after receiving 

critical labs values to the nursing home. Patient was recently started on 

antidepressant approximate 5 days ago. She was also started on Bactrim 5 days 

ago. The reason for the Bactrim was she had UTI symptoms. Family state that 

after starting both of those prescriptions she started having nausea and 

vomiting has not been able to keep any food down for the most part over the 

course the last 4-5 days. Patient is also experiencing increased weakness and 

fatigue. Pt denies any pain. Family can not think of anything that makes it 

better or worse.


Brief History: Over the course of the night the patient had no concerns or 

complications. Patient was up walking, had her IV fluids, and ate a full 

supper. Patient has been up with assistance to the bathroom multiple times with 

staph. She states that she feels better. She denies any pain, nausea, chest pain

, shortness of breath, dizziness, lightheadedness or edema. Patient feels that 

she is back to her baseline and would like to return to the nursing home. Vital 

signs are been reviewed and all show within patient's somnolence. Lab results 

completed this morning show her creatinine level is returning closer to her 

baseline.


Diagnosis: Stroke: No





- Discharge Data


Discharge Date: 04/06/19


Discharge Disposition: DC/Tfer to [facility]


Condition: Good





- Patient Summary/Data


Recommended Follow-up Testing/Procedures: 





Follow up in the Clinic in 2-3 days 





- Patient Instructions


Diet: Usual Diet as Tolerated


Activity: No Strenuous Activities





- Discharge Plan


*PRESCRIPTION DRUG MONITORING PROGRAM REVIEWED*: Not Applicable


*COPY OF PRESCRIPTION DRUG MONITORING REPORT IN PATIENT JANES: Not Applicable


Home Medications: 


 Home Meds





Benazepril [Lotensin] 10 mg PO DAILY 02/19/18 [History]


Brimonidine Tartrate/Timolol [Combigan 0.2%-0.5% Eye Drops] 1 drop EYEBOTH BID 

02/19/18 [History]


Brinzolamide [Azopt 1% Ophth Susp] 1 drop EYELF DAILY 02/19/18 [History]


Furosemide [Lasix] 40 mg PO DAILY 02/19/18 [History]


Melatonin 10 mg PO BEDTIME PRN 02/19/18 [History]


Omeprazole [priLOSEC OTC] 20 mg PO DAILY 02/19/18 [History]


Sennosides/Docusate Sodium [Senna-S Tablet] 1 each PO BID 02/19/18 [History]


Spironolactone [Aldactone] 25 mg PO DAILY 02/19/18 [History]


Acetaminophen/Diphenhydramine [Acetaminophen Pm Caplet] 500 mg PO QID PRN 04/06/ 19 [History]


Aspirin 81 mg PO DAILY 04/06/19 [History]


Calcium Carbonate [Tums] 500 mg PO QID PRN 04/06/19 [History]


Cholecalciferol (Vitamin D3) [D-2000] 2,000 unit PO DAILY 04/06/19 [History]


Cyanocobalamin (Vitamin B-12) [B-12] 1,000 mcg PO DAILY 04/06/19 [History]


Escitalopram [Lexapro] 10 mg PO DAILY 04/06/19 [History]


Metoprolol Succinate [Toprol Xl] 25 mg PO DAILY 04/06/19 [History]


Rosuvastatin [Crestor] 5 mg PO DAILY 04/06/19 [History]


cloNIDine HCl [Catapres] 0.1 mg PO BID 04/06/19 [History]


oxyCODONE 2.5 mg PO Q4HR PRN 04/06/19 [History]








Oxygen Therapy Mode: Room Air


Patient Handouts:  Dehydration, Adult, Easy-to-Read


Forms:  ED Department Discharge


Referrals: 


Kassandra Guevara MD [Primary Care Provider] - 





- Discharge Summary/Plan Comment


DC Time >30 min.: No





- General Info


Date of Service: 04/06/19


Functional Status: Reports: Pain Controlled, Tolerating Diet, Ambulating, 

Urinating





- Review of Systems


General: Reports: No Symptoms


HEENT: Reports: No Symptoms


Pulmonary: Reports: No Symptoms


Cardiovascular: Reports: No Symptoms


Gastrointestinal: Reports: No Symptoms


Genitourinary: Reports: No Symptoms


Musculoskeletal: Reports: No Symptoms


Skin: Reports: No Symptoms


Neurological: Reports: No Symptoms


Psychiatric: Reports: No Symptoms





- Patient Data


Vitals - Most Recent: 


 Last Vital Signs











Temp  37.2 C   04/06/19 10:00


 


Pulse  65   04/06/19 10:00


 


Resp  20   04/06/19 10:00


 


BP  145/59 H  04/06/19 10:00


 


Pulse Ox  100   04/06/19 10:00











Weight - Most Recent: 57.697 kg


I&O - Last 24 hours: 


 Intake & Output











 04/05/19 04/06/19 04/06/19





 22:59 06:59 14:59


 


Intake Total 100 1671 120


 


Output Total 150 800 800


 


Balance -50 871 -680











Lab Results - Last 24 hrs: 


 Laboratory Results - last 24 hr











  04/05/19 04/05/19 04/06/19 Range/Units





  15:30 15:30 07:50 


 


WBC  10.9 H   8.5  (4.0-10.0)  x10^3/uL


 


RBC  3.76 L   3.49 L  (4.00-5.50)  x10^6/uL


 


Hgb  11.0 L   10.1 L  (12.0-16.0)  g/dL


 


Hct  34.3   33.0  (33.0-47.0)  %


 


MCV  91.2   94.6 H D  (78.0-93.0)  fL


 


MCH  29.3   28.9  (26.0-32.0)  pg


 


MCHC  32.1   30.6 L  (32.0-36.0)  g/dL


 


RDW Coeff of Raegan  18.8 H   18.9 H  (10.0-15.0)  %


 


Plt Count  244   206  (130-400)  x10^3/uL


 


Neut % (Auto)    64.6  (50.0-80.0)  %


 


Lymph % (Auto)    27.3  (25.0-50.0)  %


 


Mono % (Auto)    7.2  (2.0-11.0)  %


 


Eos % (Auto)    0.7  (0.0-4.0)  %


 


Baso % (Auto)    0.2  (0.2-1.2)  %


 


Add Manual Diff  Yes    


 


Neutrophils % (Manual)  64    (50-80)  %


 


Lymphocytes % (Manual)  14 L    (25-50)  %


 


Reactive Lymphs %  10 H    (0)  %


 


Monocytes % (Manual)  10    (2-11)  %


 


Eosinophils % (Manual)  2    (0-4)  %


 


Vacuolated Monocytes  Rare    


 


Platelet Estimate  Adequate    


 


Anisocytosis  1+ slight H    


 


Ovalocytes  1+ slight H    


 


Rouleaux  1+ slight H    


 


Sodium   132 L D   (136-145)  mmol/L


 


Potassium   5.7 H   (3.5-5.1)  mmol/L


 


Chloride   99   ()  mmol/L


 


Carbon Dioxide   19 L   (21-32)  mmol/L


 


Anion Gap   19.7   (10-20)  mmol/L


 


BUN   63 H D   (7-18)  mg/dL


 


Creatinine   3.1 H* D   (0.55-1.02)  mg/dL


 


Est Cr Clr Drug Dosing   TNP   


 


Estimated GFR (MDRD)   14   


 


Glucose   131 H   ()  mg/dL


 


Calcium   9.7   (8.5-10.1)  mg/dL


 


Corrected Calcium   10.10   (8.5-10.1)  mg/dL


 


Total Bilirubin   0.3   (0.2-1.0)  mg/dL


 


AST   13 L   (15-37)  U/L


 


ALT   17   (14-59)  U/L


 


Alkaline Phosphatase   115   ()  U/L


 


NT-Pro-B Natriuret Pep   565 H   (<=450)  pg/mL


 


Total Protein   7.1   (6.4-8.2)  g/dL


 


Albumin   3.5   (3.4-5.0)  g/dL


 


Globulin   3.6   


 


Albumin/Globulin Ratio   0.97   














  04/06/19 Range/Units





  07:50 


 


WBC   (4.0-10.0)  x10^3/uL


 


RBC   (4.00-5.50)  x10^6/uL


 


Hgb   (12.0-16.0)  g/dL


 


Hct   (33.0-47.0)  %


 


MCV   (78.0-93.0)  fL


 


MCH   (26.0-32.0)  pg


 


MCHC   (32.0-36.0)  g/dL


 


RDW Coeff of Raegan   (10.0-15.0)  %


 


Plt Count   (130-400)  x10^3/uL


 


Neut % (Auto)   (50.0-80.0)  %


 


Lymph % (Auto)   (25.0-50.0)  %


 


Mono % (Auto)   (2.0-11.0)  %


 


Eos % (Auto)   (0.0-4.0)  %


 


Baso % (Auto)   (0.2-1.2)  %


 


Add Manual Diff   


 


Neutrophils % (Manual)   (50-80)  %


 


Lymphocytes % (Manual)   (25-50)  %


 


Reactive Lymphs %   (0)  %


 


Monocytes % (Manual)   (2-11)  %


 


Eosinophils % (Manual)   (0-4)  %


 


Vacuolated Monocytes   


 


Platelet Estimate   


 


Anisocytosis   


 


Ovalocytes   


 


Rouleaux   


 


Sodium  142  D  (136-145)  mmol/L


 


Potassium  5.3 H  (3.5-5.1)  mmol/L


 


Chloride  109 H  ()  mmol/L


 


Carbon Dioxide  21  (21-32)  mmol/L


 


Anion Gap  17.3  (10-20)  mmol/L


 


BUN  43 H  (7-18)  mg/dL


 


Creatinine  2.1 H  (0.55-1.02)  mg/dL


 


Est Cr Clr Drug Dosing  15.91  


 


Estimated GFR (MDRD)  22  


 


Glucose  106  ()  mg/dL


 


Calcium  9.4  (8.5-10.1)  mg/dL


 


Corrected Calcium   (8.5-10.1)  mg/dL


 


Total Bilirubin   (0.2-1.0)  mg/dL


 


AST   (15-37)  U/L


 


ALT   (14-59)  U/L


 


Alkaline Phosphatase   ()  U/L


 


NT-Pro-B Natriuret Pep   (<=450)  pg/mL


 


Total Protein   (6.4-8.2)  g/dL


 


Albumin   (3.4-5.0)  g/dL


 


Globulin   


 


Albumin/Globulin Ratio   











Med Orders - Current: 


 Current Medications





Acetaminophen (Tylenol)  650 mg PO Q4H PRN


   PRN Reason: Pain (Mild 1-3)/fever


Brimonidine Tartrate (Alphagan 0.2% Ophth Soln)  0 ml EYEBOTH BEDTIME [facility]


   Last Admin: 04/05/19 21:00 Dose:  1 drop


Doxazosin Mesylate (Cardura)  1 mg PO BEDTIME [facility]


   Last Admin: 04/05/19 21:00 Dose:  1 mg


Furosemide (Lasix)  40 mg PO DAILY [facility]


   Last Admin: 04/06/19 08:00 Dose:  40 mg


Sodium Chloride (Normal Saline)  1,000 mls @ 125 mls/hr IV ASDIRECTED [facility]


   Last Admin: 04/06/19 09:40 Dose:  125 mls/hr


Latanoprost (Xalatan 0.005% Ophth Soln)  0 ml EYEBOTH BEDTIME [facility]


   Last Admin: 04/05/19 20:50 Dose:  1 drop


Melatonin (Melatonin)  9 mg PO BEDTIME PRN


   PRN Reason: Sleep


   Last Admin: 04/05/19 21:00 Dose:  9 mg


Omeprazole (Omeprazole)  20 mg PO DAILY@0700 [facility]


   Last Admin: 04/06/19 07:00 Dose:  20 mg


Ondansetron HCl (Zofran Odt)  4 mg PO Q6H PRN


   PRN Reason: nausea, able to take PO


Senna/Docusate Sodium (Senna Plus)  1 tab PO BID PRN


   PRN Reason: Constipation


   Last Admin: 04/05/19 21:00 Dose:  1 tab


Sodium Chloride (Saline Flush)  10 ml FLUSH ASDIRECTED PRN


   PRN Reason: Keep Vein Open


Timolol Maleate (Timoptic 0.5% Ophth Soln)  0 ml EYEBOTH BEDTIME [facility]





Discontinued Medications





Ceftriaxone Sodium (Rocephin)  1 gm IVPUSH ONETIME ONE


   Stop: 04/05/19 18:15


   Last Admin: 04/05/19 18:19 Dose:  1 gm


Sodium Chloride (Normal Saline)  1,000 mls @ 250 mls/hr IV ONETIME ONE


   Stop: 04/05/19 19:28


   Last Admin: 04/05/19 15:39 Dose:  250 mls/hr


Ondansetron HCl (Zofran)  4 mg IVPUSH ONETIME ONE


   Stop: 04/05/19 18:16


   Last Admin: 04/05/19 18:19 Dose:  4 mg











- Exam


General: Reports: Alert


HEENT: Reports: Pupils Equal, Pupils Reactive, EOMI, Mucous Membr. Moist/Pink


Neck: Reports: Supple


Lungs: Reports: Clear to Auscultation, Normal Respiratory Effort


Cardiovascular: Reports: Regular Rate, Regular Rhythm


GI/Abdominal Exam: Normal Bowel Sounds, Soft, Non-Tender, No Distention


Back Exam: Reports: Normal Inspection, Full Range of Motion


Extremities: Normal Inspection, Non-Tender, No Pedal Edema, Normal Capillary 

Refill


Skin: Reports: Warm, Dry, Intact


Neurological: Reports: No New Focal Deficit


Psy/Mental Status: Reports: Alert, Normal Affect, Normal Mood

## 2020-03-05 NOTE — EDM.PDOC
ED HPI GENERAL MEDICAL PROBLEM





- General


Chief Complaint: General


Time Seen by Provider: 03/05/20 11:27


Source of Information: Reports: Patient


History Limitations: Reports: No Limitations





- History of Present Illness


INITIAL COMMENTS - FREE TEXT/NARRATIVE: 





Pt. presents to ER from [facility]. Staff states that she "slipped out of her 

wheelchair" this AM. When they were assessing the patient after the fall, she 

was found to by hypotensive (systolic BP in the 80s) and was subsequently 

transferred to ER. She denies striking her head. Denies any chest pain or 

shortness of breath. No neck pain. Pt. is currently been experiencing symptoms 

of vomiting, diarrhea, and upper respiratory tract infection. She has been 

exposed to numerous ill contacts. She states, in fact, that the GI symptoms 

resolved yesterday.


Overall, she states she is feeling well. EMS relates that the patient was 

normotensive during transport to ER.


Onset: Today


Location: Reports: Chest, Abdomen, Generalized


Associated Symptoms: Reports: Cough, Nausea/Vomiting.  Denies: Fever/Chills


Treatments PTA: Reports: EKG





- Related Data


 Allergies











Allergy/AdvReac Type Severity Reaction Status Date / Time


 


amlodipine Allergy  Other Verified 03/05/20 11:42


 


simvastatin Allergy  Other Verified 03/05/20 11:42











Home Meds: 


 Home Meds





Benazepril [Lotensin] 10 mg PO DAILY 02/19/18 [History]


Brimonidine Tartrate/Timolol [Combigan 0.2%-0.5% Eye Drops] 1 drop EYEBOTH BID 

02/19/18 [History]


Brinzolamide [Azopt 1% Ophth Susp] 1 drop EYELF BID 02/19/18 [History]


Furosemide [Lasix] 60 mg PO DAILY 02/19/18 [History]


Omeprazole [priLOSEC OTC] 20 mg PO DAILY 02/19/18 [History]


Acetaminophen [Tylenol Extra Strength] 500 mg PO TID 04/06/19 [History]


Aspirin 81 mg PO DAILY 04/06/19 [History]


Cholecalciferol (Vitamin D3) [D-2000] 2,000 unit PO DAILY 04/06/19 [History]


Cyanocobalamin (Vitamin B-12) [B-12] 1,000 mcg PO DAILY 04/06/19 [History]


Metoprolol Succinate [Toprol Xl] 25 mg PO DAILY 04/06/19 [History]


Rosuvastatin [Crestor] 5 mg PO DAILY 04/06/19 [History]


cloNIDine HCl [Catapres] 0.1 mg PO BID 04/06/19 [History]


Acetaminophen [Tylenol Extra Strength] 500 mg PO Q8HR PRN 03/05/20 [History]


Bisacodyl 5 mg PO DAILY PRN 03/05/20 [History]


Calcium Carbonate [Tums] 500 mg PO QID PRN 03/05/20 [History]


Docusate Sodium [Colace] 100 mg PO DAILY PRN 03/05/20 [History]


Levothyroxine 25 mcg PO DAILY 03/05/20 [History]


Loperamide HCl [Imodium A-D] 2 mg PO ASDIRECTED PRN 03/05/20 [History]


Loperamide HCl [Imodium A-D] 4 mg PO ASDIRECTED PRN 03/05/20 [History]


Multivitamin [Multivitamins] 1 cap PO DAILY 03/05/20 [History]


Ondansetron [Zofran] 4 mg PO Q6HR PRN 03/05/20 [History]


Sertraline [Zoloft] 12.5 mg PO DAILY 03/05/20 [History]


Triamcinolone Acetonide [Triamcinolone Acetonide 0.1% Crm] 1 applic TOP BID PRN 

03/05/20 [History]











Past Medical History


HEENT History: Reports: Macular Degeneration


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


Cardiovascular History: Reports: High Cholesterol, Hypertension


Respiratory History: Reports: None


Gastrointestinal History: Reports: GERD


Other Gastrointestinal History: marks's esophagus


Genitourinary History: Reports: Chronic Renal Insuffiency


Neurological History: Reports: TIA


Other Neuro History: transient neurologic deficit


Psychiatric History: Reports: Depression


Endocrine/Metabolic History: Reports: Diabetes, Type II, Hypothyroidism, 

Vitamin D Deficiency


Hematologic History: Reports: Anemia


Oncologic (Cancer) History: Reports: None


Dermatologic History: Reports: None





- Past Surgical History


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


Musculoskeletal Surgical History: Reports: Other (See Below) (IM nailing)





Social & Family History





- Family History


Family Medical History: Noncontributory


Cardiac: Reports: CAD





- Tobacco Use


Smoking Status *Q: Never Smoker





- Caffeine Use


Caffeine Use: Reports: Coffee





- Recreational Drug Use


Recreational Drug Use: No





- Living Situation & Occupation


Living situation: Reports: , Extended Care Facility


Occupation: Retired





ED ROS GENERAL





- Review of Systems


Review Of Systems: See Below


Constitutional: Reports: Decreased Appetite


HEENT: Reports: No Symptoms


Respiratory: Reports: No Symptoms


Cardiovascular: Reports: No Symptoms


Endocrine: Reports: No Symptoms


GI/Abdominal: Reports: Diarrhea, Nausea, Vomiting


: Reports: No Symptoms.  Denies: Dysuria


Musculoskeletal: Reports: No Symptoms


Skin: Reports: No Symptoms


Neurological: Reports: No Symptoms


Psychiatric: Reports: No Symptoms


Hematologic/Lymphatic: Reports: No Symptoms


Immunologic: Reports: No Symptoms





ED EXAM, GENERAL





- Physical Exam


Exam: See Below


Exam Limited By: No Limitations


General Appearance: Alert, WD/WN, No Apparent Distress


Eye Exam: Bilateral Eye: EOMI, Normal Fundi, Normal Inspection, PERRL


Ears: Normal External Exam, Normal Canal, Hearing Grossly Normal, Normal TMs


Ear Exam: Bilateral Ear: Auricle Normal, Canal Normal, TM normal


Nose: Normal Inspection, Normal Mucosa, No Blood


Throat/Mouth: Normal Inspection, Normal Lips, Normal Teeth, Normal Gums, Normal 

Oropharynx, Normal Voice, No Airway Compromise


Head: Atraumatic, Normocephalic


Neck: Normal Inspection, Supple, Non-Tender, Full Range of Motion


Respiratory/Chest: No Respiratory Distress, Lungs Clear, Normal Breath Sounds, 

No Accessory Muscle Use, Chest Non-Tender


Cardiovascular: Normal Peripheral Pulses, Regular Rate, Rhythm, No Edema, No 

Gallop, No JVD, No Murmur, No Rub


Peripheral Pulses: 2+: Radial (L), Radial (R)


GI/Abdominal: Normal Bowel Sounds, Soft, Non-Tender, No Organomegaly, No 

Distention, No Mass


Extremities: Non-Tender, Normal Capillary Refill, Pedal Edema (trace to the 

ankles bilaterally)


Neurological: Alert, Oriented, CN II-XII Intact, Normal Cognition, Normal Gait, 

Normal Reflexes, No Motor/Sensory Deficits


Psychiatric: Normal Affect, Normal Mood


Skin Exam: Warm, Dry, Intact, Normal Color


Lymphatic: No Adenopathy





Course





- Vital Signs


Last Recorded V/S: 





 Last Vital Signs











Temp  36.6 C   03/05/20 18:00


 


Pulse  72   03/05/20 18:00


 


Resp  16   03/05/20 18:00


 


BP  164/102 H  03/05/20 18:00


 


Pulse Ox  99   03/05/20 18:00














- Orders/Labs/Meds


Orders: 





 Active Orders 24 hr











 Category Date Time Status


 


 Peripheral IV Insertion Adult [OM.PC] Routine Oth  03/05/20 12:11 Ordered








 Medication Orders





Acetaminophen (Tylenol Extra Strength)  500 mg PO Q8H PRN


   PRN Reason: Pain


Acetaminophen (Tylenol Extra Strength)  500 mg PO TID [facility]


Aspirin (Aspirin)  81 mg PO DAILY [facility]


Atorvastatin Calcium (Lipitor)  20 mg PO DAILY [facility]


Bisacodyl (Dulcolax)  5 mg PO DAILY PRN


   PRN Reason: Constipation


Brimonidine Tartrate (Alphagan 0.2% Ophth Soln)  0 ml EYEBOTH BID [facility]


Clonidine HCl (Catapres)  0.1 mg PO BID [facility]


Cyanocobalamin (Vitamin B12)  1,000 mcg PO DAILY [facility]


Docusate Sodium (Colace)  100 mg PO DAILY PRN


   PRN Reason: Constipation


Dorzolamide HCl (Trusopt 2% Ophth Soln)  1 ml EYELF BID [facility]


Furosemide (Lasix)  60 mg PO DAILY [facility]


Heparin Sodium (Porcine) (Heparin Sodium)  5,000 units SUBCUT BID [facility]


Lactated Ringer's (Ringers, Lactated)  1,000 mls @ 125 mls/hr IV ASDIRECTED [facility]


   Last Admin: 03/05/20 14:06  Dose: 125 mls/hr


Levothyroxine Sodium (Levothyroxine)  25 mcg PO DAILY [facility]


Metoprolol Succinate (Toprol Xl)  25 mg PO DAILY [facility]


Omeprazole (Omeprazole)  20 mg PO DAILY@0700 [facility]


Sertraline HCl (Zoloft)  12.5 mg PO DAILY [facility]


Sodium Chloride (Saline Flush)  10 ml FLUSH ASDIRECTED PRN


   PRN Reason: Keep Vein Open


Timolol Maleate (Timoptic 0.5% Ophth Soln)  0 ml EYEBOTH BID [facility]








Labs: 





 Laboratory Tests











  03/05/20 03/05/20 Range/Units





  11:40 11:40 


 


WBC  13.2 H   (4.0-10.0)  x10^3/uL


 


RBC  3.47 L   (4.00-5.50)  x10^6/uL


 


Hgb  10.3 L   (12.0-16.0)  g/dL


 


Hct  31.7 L   (33.0-47.0)  %


 


MCV  91.4  D   (78.0-93.0)  fL


 


MCH  29.7   (26.0-32.0)  pg


 


MCHC  32.5   (32.0-36.0)  g/dL


 


RDW Coeff of Raegan  15.9 H   (10.0-15.0)  %


 


Plt Count  202   (130-400)  x10^3/uL


 


Neut % (Auto)  62.7   (50.0-80.0)  %


 


Lymph % (Auto)  26.3   (25.0-50.0)  %


 


Mono % (Auto)  10.2   (2.0-11.0)  %


 


Eos % (Auto)  0.6   (0.0-4.0)  %


 


Baso % (Auto)  0.2   (0.2-1.2)  %


 


Sodium   139  (136-145)  mmol/L


 


Potassium   3.6  (3.5-5.1)  mmol/L


 


Chloride   104  ()  mmol/L


 


Carbon Dioxide   19 L  (21-32)  mmol/L


 


Anion Gap   19.6  (10-20)  mmol/L


 


BUN   97 H* D  (7-18)  mg/dL


 


Creatinine   3.0 H D  (0.55-1.02)  mg/dL


 


Est Cr Clr Drug Dosing   TNP  


 


Estimated GFR (MDRD)   15  


 


Glucose   95  ()  mg/dL


 


Calcium   8.7  (8.5-10.1)  mg/dL


 


Corrected Calcium   9.42  (8.5-10.1)  mg/dL


 


Phosphorus   4.0  (2.6-4.7)  mg/dL


 


Magnesium   1.8  (1.8-2.4)  mg/dL


 


Total Bilirubin   0.3  (0.2-1.0)  mg/dL


 


AST   27  (15-37)  U/L


 


ALT   26  (14-59)  U/L


 


Alkaline Phosphatase   87  ()  U/L


 


Total Protein   6.3 L  (6.4-8.2)  g/dL


 


Albumin   3.1 L  (3.4-5.0)  g/dL


 


Globulin   3.2  


 


Albumin/Globulin Ratio   0.97  











Meds: 





Medications











Generic Name Dose Route Start Last Admin





  Trade Name Freq  PRN Reason Stop Dose Admin


 


Acetaminophen  500 mg  03/05/20 13:59  





  Tylenol Extra Strength  PO   





  Q8H PRN   





  Pain   





     





     





     


 


Acetaminophen  500 mg  03/05/20 20:00  





  Tylenol Extra Strength  PO   





  TID [facility]   





     





     





     





     


 


Aspirin  81 mg  03/06/20 08:00  





  Aspirin  PO   





  DAILY [facility]   





     





     





     





     


 


Atorvastatin Calcium  20 mg  03/06/20 08:00  





  Lipitor  PO   





  DAILY [facility]   





     





     





     





     


 


Bisacodyl  5 mg  03/05/20 13:59  





  Dulcolax  PO   





  DAILY PRN   





  Constipation   





     





     





     


 


Brimonidine Tartrate  0 ml  03/05/20 20:00  





  Alphagan 0.2% Ophth Soln  EYEBOTH   





  BID [facility]   





     





     





     





     


 


Clonidine HCl  0.1 mg  03/05/20 20:00  





  Catapres  PO   





  BID [facility]   





     





     





     





     


 


Cyanocobalamin  1,000 mcg  03/06/20 08:00  





  Vitamin B12  PO   





  DAILY [facility]   





     





     





     





     


 


Docusate Sodium  100 mg  03/05/20 13:59  





  Colace  PO   





  DAILY PRN   





  Constipation   





     





     





     


 


Dorzolamide HCl  1 ml  03/06/20 09:00  





  Trusopt 2% Ophth Soln  EYELF   





  BID [facility]   





     





     





     





     


 


Furosemide  60 mg  03/06/20 08:00  





  Lasix  PO   





  DAILY [facility]   





     





     





     





     


 


Heparin Sodium (Porcine)  5,000 units  03/05/20 20:00  





  Heparin Sodium  SUBCUT   





  BID [facility]   





     





     





     





     


 


Lactated Ringer's  1,000 mls @ 125 mls/hr  03/05/20 13:15  03/05/20 14:06





  Ringers, Lactated  IV   125 mls/hr





  ASDIRECTED [facility]   Administration





     





     





     





     


 


Levothyroxine Sodium  25 mcg  03/06/20 08:00  





  Levothyroxine  PO   





  DAILY [facility]   





     





     





     





     


 


Metoprolol Succinate  25 mg  03/06/20 08:00  





  Toprol Xl  PO   





  DAILY [facility]   





     





     





     





     


 


Omeprazole  20 mg  03/06/20 07:00  





  Omeprazole  PO   





  DAILY@0700 [facility]   





     





     





     





     


 


Sertraline HCl  12.5 mg  03/06/20 08:00  





  Zoloft  PO   





  DAILY [facility]   





     





     





     





     


 


Sodium Chloride  10 ml  03/05/20 12:18  





  Saline Flush  FLUSH   





  ASDIRECTED PRN   





  Keep Vein Open   





     





     





     


 


Timolol Maleate  0 ml  03/05/20 20:00  





  Timoptic 0.5% Ophth Soln  EYEBOTH   





  BID [facility]   





     





     





     





     














Discontinued Medications














Generic Name Dose Route Start Last Admin





  Trade Name Freq  PRN Reason Stop Dose Admin


 


Sodium Chloride  1,000 mls @ 500 mls/hr  03/05/20 12:18  03/05/20 12:28





  Normal Saline  IV  03/05/20 14:17  500 mls/hr





  .BOLUS ONE   Administration





     





     





     





     














Departure





- Departure


Time of Disposition: 13:05


Disposition: Admitted As Inpatient 66


Clinical Impression: 


 Viral illness, Dehydration, BRANDY (acute kidney injury)








- Discharge Information





Sepsis Event Note





- Evaluation


Sepsis Screening Result: No Definite Risk





- Focused Exam


Vital Signs: 





 Vital Signs











  Temp Pulse Resp BP Pulse Ox


 


 03/05/20 11:27  36.2 C  69  18  142/104 H  99











Date Exam was Performed: 03/05/20


Time Exam was Performed: 18:07





- Problem List Review


Problem List Initiated/Reviewed/Updated: Yes





- My Orders


Last 24 Hours: 





My Active Orders





03/05/20 12:11


Peripheral IV Insertion Adult [OM.PC] Routine 














- Assessment/Plan


Last 24 Hours: 





My Active Orders





03/05/20 12:11


Peripheral IV Insertion Adult [OM.PC] Routine 











Plan: 





Pt. admitted acutely by Dr. Dozier. Please refer to her H and P. Pt. was 

given a 500ml bolus of NS on admission. All questions were answered.

## 2020-03-05 NOTE — PCM.HP.2
H&P History of Present Illness





- General


Date of Service: 03/05/20


Admit Problem/Dx: 


 Admission Diagnosis/Problem





Admission Diagnosis/Problem      Dehydration








Source of Information: Patient


History Limitations: Reports: No Limitations





- History of Present Illness


Initial Comments - Free Text/Narative: 





Mrs. Leigh is an 86 yo female with PMH of CKD, anemia, GERD, major depression

, h/o TIA, macular degeneration, hypertension, hyperlipidemia, hypothyroidism, 

B12 deficiency, and type 2 DM who was brought to the ER for evaluation 

following a fall at the [facility] this morning. She states that she was 

trying to sit down and she missed the chair. She did not have any syncope and 

did not hit her head. She denies any injuries from the fall. With routine post-

fall blood pressure monitoring, her SBP progressively dropped to 89. Therefore, 

I was contacted by the [facility] nurse and advised the patient be evaluated 

in the ER. The patient denies any concerns today. She has recently had a viral 

illness manifested by vomiting, diarrhea, and URI symptoms. She cannot recall 

exactly when this was or when it resolved and there are no staff with her 

today. She states that she is feeling fine overall today and is not sure why 

she is in the ER. She has not had any fevers. She denies any weakness but notes 

from the [facility] note that she has been weak recently as well.





The patient otherwise denies any new or unusual headaches, chest pain, 

shortness of breath, palpitations, or changes in urination. She states she is 

voiding regularly and has not noticed any difference in amount or frequency of 

urination.





- Related Data


Allergies/Adverse Reactions: 


 Allergies











Allergy/AdvReac Type Severity Reaction Status Date / Time


 


amlodipine Allergy  Other Verified 03/05/20 11:42


 


simvastatin Allergy  Other Verified 03/05/20 11:42











Home Medications: 


 Home Meds





Benazepril [Lotensin] 10 mg PO DAILY 02/19/18 [History]


Brimonidine Tartrate/Timolol [Combigan 0.2%-0.5% Eye Drops] 1 drop EYEBOTH BID 

02/19/18 [History]


Brinzolamide [Azopt 1% Ophth Susp] 1 drop EYELF BID 02/19/18 [History]


Furosemide [Lasix] 40 mg PO DAILY 02/19/18 [History]


Melatonin 10 mg PO BEDTIME PRN 02/19/18 [History]


Omeprazole [priLOSEC OTC] 20 mg PO DAILY 02/19/18 [History]


Sennosides/Docusate Sodium [Senna-S Tablet] 1 each PO BID 02/19/18 [History]


Spironolactone [Aldactone] 25 mg PO DAILY 02/19/18 [History]


Acetaminophen [Tylenol Extra Strength] 500 mg PO QID 04/06/19 [History]


Aspirin 81 mg PO DAILY 04/06/19 [History]


Calcium Carbonate [Tums] 500 mg PO QID PRN 04/06/19 [History]


Cholecalciferol (Vitamin D3) [D-2000] 2,000 unit PO DAILY 04/06/19 [History]


Cyanocobalamin (Vitamin B-12) [B-12] 1,000 mcg PO DAILY 04/06/19 [History]


Escitalopram [Lexapro] 10 mg PO DAILY 04/06/19 [History]


Metoprolol Succinate [Toprol Xl] 25 mg PO DAILY 04/06/19 [History]


Rosuvastatin [Crestor] 5 mg PO DAILY 04/06/19 [History]


cloNIDine HCl [Catapres] 0.1 mg PO BID 04/06/19 [History]


oxyCODONE 2.5 mg PO Q4HR PRN 04/06/19 [History]











Past Medical History


HEENT History: Reports: Macular Degeneration


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


Cardiovascular History: Reports: High Cholesterol, Hypertension


Respiratory History: Reports: None


Gastrointestinal History: Reports: GERD


Other Gastrointestinal History: marks's esophagus


Genitourinary History: Reports: Chronic Renal Insuffiency


Neurological History: Reports: TIA


Other Neuro History: transient neurologic deficit


Psychiatric History: Reports: Depression


Endocrine/Metabolic History: Reports: Diabetes, Type II, Hypothyroidism, 

Vitamin D Deficiency


Hematologic History: Reports: Anemia


Oncologic (Cancer) History: Reports: None


Dermatologic History: Reports: None





- Past Surgical History


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


Musculoskeletal Surgical History: Reports: Other (See Below) (IM nailing)





Social & Family History





- Family History


Cardiac: Reports: CAD





- Tobacco Use


Smoking Status *Q: Never Smoker





- Alcohol Use


Alcohol Use History: No


Alcohol Use in Last Twelve Months: No





- Recreational Drug Use


Recreational Drug Use: No





- Living Situation & Occupation


Living situation: Reports: , Extended Care Facility


Occupation: Retired





H&P Review of Systems





- Review of Systems:


Review Of Systems: See Below


General: Reports: No Symptoms


HEENT: Reports: No Symptoms


Pulmonary: Reports: No Symptoms


Cardiovascular: Reports: No Symptoms


Gastrointestinal: Reports: No Symptoms


Genitourinary: Reports: No Symptoms


Musculoskeletal: Reports: No Symptoms


Skin: Reports: No Symptoms


Psychiatric: Reports: No Symptoms


Neurological: Reports: No Symptoms





Exam





- Exam


Exam: See Below





- Vital Signs


Vital Signs: 


 Last Vital Signs











Temp  36.2 C   03/05/20 11:27


 


Pulse  69   03/05/20 11:27


 


Resp  18   03/05/20 11:27


 


BP  142/104 H  03/05/20 11:27


 


Pulse Ox  99   03/05/20 11:27














- Exam


General: Alert, Oriented, Cooperative


HEENT: Mucosa Moist & [place], Posterior Pharynx Clear, TMs Clear, Other (scarring 

right cornea; left pupil round and reactive to light)


Neck: Supple, Trachea Midline.  No: Lymphadenopathy, Thyromegaly


Lungs: Clear to Auscultation, Normal Respiratory Effort


Cardiovascular: Regular Rate, Regular Rhythm, Normal S1, Normal S2


GI/Abdominal Exam: Normal Bowel Sounds, Soft, Non-Tender, No Organomegaly, No 

Distention, No Mass


Extremities: Non-Tender, Normal Capillary Refill, Pedal Edema (trace to the 

ankles bilaterally)


Peripheral Pulses: 2+: Radial (L), Radial (R)


Skin: Warm, Dry, Intact


Neurological: Other (moves all 4 extremities equally)





- Patient Data


Lab Results Last 24 hrs: 


 Laboratory Results - last 24 hr











  03/05/20 03/05/20 Range/Units





  11:40 11:40 


 


WBC  13.2 H   (4.0-10.0)  x10^3/uL


 


RBC  3.47 L   (4.00-5.50)  x10^6/uL


 


Hgb  10.3 L   (12.0-16.0)  g/dL


 


Hct  31.7 L   (33.0-47.0)  %


 


MCV  91.4  D   (78.0-93.0)  fL


 


MCH  29.7   (26.0-32.0)  pg


 


MCHC  32.5   (32.0-36.0)  g/dL


 


RDW Coeff of Raegan  15.9 H   (10.0-15.0)  %


 


Plt Count  202   (130-400)  x10^3/uL


 


Neut % (Auto)  62.7   (50.0-80.0)  %


 


Lymph % (Auto)  26.3   (25.0-50.0)  %


 


Mono % (Auto)  10.2   (2.0-11.0)  %


 


Eos % (Auto)  0.6   (0.0-4.0)  %


 


Baso % (Auto)  0.2   (0.2-1.2)  %


 


Sodium   139  (136-145)  mmol/L


 


Potassium   3.6  (3.5-5.1)  mmol/L


 


Chloride   104  ()  mmol/L


 


Carbon Dioxide   19 L  (21-32)  mmol/L


 


Anion Gap   19.6  (10-20)  mmol/L


 


BUN   97 H* D  (7-18)  mg/dL


 


Creatinine   3.0 H D  (0.55-1.02)  mg/dL


 


Est Cr Clr Drug Dosing   TNP  


 


Estimated GFR (MDRD)   15  


 


Glucose   95  ()  mg/dL


 


Calcium   8.7  (8.5-10.1)  mg/dL


 


Corrected Calcium   9.42  (8.5-10.1)  mg/dL


 


Phosphorus   4.0  (2.6-4.7)  mg/dL


 


Magnesium   1.8  (1.8-2.4)  mg/dL


 


Total Bilirubin   0.3  (0.2-1.0)  mg/dL


 


AST   27  (15-37)  U/L


 


ALT   26  (14-59)  U/L


 


Alkaline Phosphatase   87  ()  U/L


 


Total Protein   6.3 L  (6.4-8.2)  g/dL


 


Albumin   3.1 L  (3.4-5.0)  g/dL


 


Globulin   3.2  


 


Albumin/Globulin Ratio   0.97  











Result Diagrams: 


 03/05/20 11:40





 03/05/20 11:40





Sepsis Event Note





- Evaluation


Sepsis Screening Result: No Definite Risk





- Focused Exam


Vital Signs: 


 Vital Signs











  Temp Pulse Resp BP Pulse Ox


 


 03/05/20 11:27  36.2 C  69  18  142/104 H  99











Date Exam was Performed: 03/05/20


Time Exam was Performed: 13:09





- Problem List


(1) BRANDY (acute kidney injury)


SNOMED Code(s): 07145582, 98890776


   ICD Code: N17.9 - ACUTE KIDNEY FAILURE, UNSPECIFIED   Status: Acute   

Priority: Medium   Current Visit: No   





(2) CKD (chronic kidney disease) stage 3, GFR 30-59 ml/min


SNOMED Code(s): 752688371


   ICD Code: N18.3 - CHRONIC KIDNEY DISEASE, STAGE 3 (MODERATE)   Status: 

Chronic   Current Visit: No   





(3) Dehydration


SNOMED Code(s): 57558587


   ICD Code: E86.0 - DEHYDRATION   Status: Acute   Priority: Medium   Current 

Visit: No   





(4) Viral syndrome


SNOMED Code(s): 82972519


   ICD Code: B34.9 - VIRAL INFECTION, UNSPECIFIED   Status: Acute   Current 

Visit: Yes   





(5) DM2 (diabetes mellitus, type 2)


SNOMED Code(s): 26867742


   ICD Code: E11.9 - TYPE 2 DIABETES MELLITUS WITHOUT COMPLICATIONS   Status: 

Chronic   Current Visit: No   


Qualifiers: 


   Diabetes mellitus long term insulin use: without long term use   Diabetes 

mellitus complication status: without complication   Qualified Code(s): E11.9 - 

Type 2 diabetes mellitus without complications   





(6) HTN (hypertension), benign


SNOMED Code(s): 00146951


   ICD Code: I10 - ESSENTIAL (PRIMARY) HYPERTENSION   Status: Chronic   Current 

Visit: No   





(7) Pure hypercholesterolemia


SNOMED Code(s): 940350482


   ICD Code: E78.00 - PURE HYPERCHOLESTEROLEMIA, UNSPECIFIED   Status: Chronic 

  Current Visit: No   





(8) Hx TIA/stroke w/o resid


SNOMED Code(s): 098200432, 050392285, 538016197


   ICD Code: Z86.73 - PRSNL HX OF TIA (TIA), AND CEREB INFRC W/O RESID DEFICITS

   Status: Chronic   Current Visit: Yes   





(9) Macular degeneration


SNOMED Code(s): 163184854


   ICD Code: H35.30 - UNSPECIFIED MACULAR DEGENERATION   Status: Chronic   

Current Visit: Yes   


Qualifiers: 


   Macular degeneration type: unspecified type   Eye laterality: unspecified   

Qualified Code(s): H35.30 - Unspecified macular degeneration   





(10) B12 deficiency


SNOMED Code(s): 647099089


   ICD Code: E53.8 - DEFICIENCY OF OTHER SPECIFIED B GROUP VITAMINS   Status: 

Chronic   Current Visit: Yes   





(11) Hypothyroidism


SNOMED Code(s): 93897612


   ICD Code: E03.9 - HYPOTHYROIDISM, UNSPECIFIED   Status: Chronic   Current 

Visit: Yes   


Qualifiers: 


   Hypothyroidism type: acquired   Qualified Code(s): E03.9 - Hypothyroidism, 

unspecified   





(12) Depression


SNOMED Code(s): 90919945


   ICD Code: F32.9 - MAJOR DEPRESSIVE DISORDER, SINGLE EPISODE, UNSPECIFIED   

Status: Chronic   Current Visit: No   Problem Details: start prozac 2/20/18   


Qualifiers: 


   Depression Type: major depressive disorder   Major depression recurrence: 

recurrent   Active/Remission status: in full remission   Qualified Code(s): 

F33.42 - Major depressive disorder, recurrent, in full remission   





(13) Anemia


SNOMED Code(s): 152704349


   ICD Code: D64.9 - ANEMIA, UNSPECIFIED   Status: Chronic   Current Visit: Yes

   


Qualifiers: 


   Anemia type: unspecified type   Qualified Code(s): D64.9 - Anemia, 

unspecified   


Problem List Initiated/Reviewed/Updated: Yes


Orders Last 24hrs: 


 Active Orders 24 hr











 Category Date Time Status


 


 Patient Status [ADT] Routine ADT  03/05/20 12:18 Active


 


 Notify Provider Vital Signs [RC] ASDIRECTED Care  03/05/20 13:05 Ordered


 


 Oxygen Therapy [RC] PRN Care  03/05/20 13:05 Ordered


 


 Up With Assistance [RC] ASDIRECTED Care  03/05/20 13:05 Ordered


 


 VTE/DVT Education [RC] PER UNIT ROUTINE Care  03/05/20 13:05 Ordered


 


 Vital Signs [RC] Q4H Care  03/05/20 13:05 Ordered


 


 Regular Diet [DIET] Diet  03/05/20 Dinner Ordered


 


 BASIC METABOLIC PANEL,BMP [CHEM] Routine Lab  03/06/20 05:11 Ordered


 


 CBC WITH AUTO DIFF [HEME] Routine Lab  03/06/20 05:11 Ordered


 


 Heparin Sodium Med  03/05/20 20:00 Ordered





 5,000 units SUBCUT BID   


 


 Lactated Ringers @ 125 MLS/HR(1,000ml) Med  03/05/20 13:15 Ordered





 Lactated Ringers [Ringers, Lactated] 1,000 ml   





 IV ASDIRECTED   


 


 Sodium Chloride 0.9% [Saline Flush] Med  03/05/20 12:11 Active





 10 ml FLUSH ASDIRECTED PRN   


 


 Sodium Chloride 0.9% [Saline Flush] Med  03/05/20 12:18 Active





 10 ml FLUSH ASDIRECTED PRN   


 


 Peripheral IV Insertion Adult [OM.PC] Routine Oth  03/05/20 12:11 Ordered


 


 Peripheral IV Insertion Adult [OM.PC] Routine Oth  03/05/20 12:18 Ordered


 


 Resuscitation Status Routine Resus Stat  03/05/20 13:05 Ordered








 Medication Orders





Heparin Sodium (Porcine) (Heparin Sodium)  5,000 units SUBCUT BID ARNOLD


Sodium Chloride (Saline Flush)  10 ml FLUSH ASDIRECTED PRN


   PRN Reason: Keep Vein Open


Sodium Chloride (Saline Flush)  10 ml FLUSH ASDIRECTED PRN


   PRN Reason: Keep Vein Open








Assessment/Plan Comment:: 





86 yo female admitted with BRANDY after presenting to the ER for evaluation of 

hypotension and a fall this morning.





#1 BRANDY


#2 CKD


#3 Dehydration





- Crt up to 3 today compared to last clinic reading of 1.1 (January 2020).


- Suspected to be secondary to dehydration in the setting of her recent viral 

illness.


- Will give IV fluids today and overnight.


- Recheck labs in the morning.


- Will consider further evaluation with urine studies and imaging if labs do 

not improve with fluids alone.





#4 Viral Syndrome





- Symptoms are now resolved.


- Nothing to suggest any bacterial infection.


- Note elevation in WBC; however, this is also likely secondary to dehydration.


- Therefore, will continue with fluids as above and recheck in the am.





#5 DM2





- This is diet controlled.


- Therefore, will only check glucoses with am labs and otherwise as indicated 

by symptoms.





#6 Hypertension





- BP actually high while in the ER and this has been traditionally somewhat 

difficult to control.


- Will continue all of her home medications except for benazepril, which will 

be held in the setting of BRANDY.





#7 Hyperlipidemia


#8 h/o TIA


#9 Macular Degeneration


#10 B12 Deficiency


#11 Hypothyroidism


#12 Depression





- Continue home medications.





#13 Anemia





- Stable from prior.


- Will monitor daily.





Patient will be admitted to acute for IV fluids - anticipate admission for at 

least 2 midnights, likely through the weekend depending on willingness of SNF 

to accept her back over the weekend. Otherwise see details under problems 

listed above. Patient wishes to be DNR/DNI - discussed on admission. Will do 

heparin for VTE prophylaxis given her renal function precludes use of lovenox. 

PCP to follow in the am.

## 2020-03-06 NOTE — DISCH
PRIMARY DIAGNOSES:

1. Acute kidney injury.

2. Acute gastroenteritis.

3. Dehydration secondary to gastroenteritis.

4. Chronic kidney disease.

5. Labile hypertension.

6. Type 2 diabetes mellitus.

7. Anemia of chronic disease.

8. History of vascular disease with mild cognitive dysfunction.

9. Hypertension.

10.Hypercholesterolemia.

 

SUMMARY OF HISTORY AND PHYSICAL:  Patient is an 87-year-old resident of [facility], who presented to the emergency room as there was a call that her

blood pressure was low 80/40 systolic and she was not keeping foods down.  I had

been contacted a few days previously and her blood pressure had been up to

200/100, as she had thrown up her blood pressure medications.  She was not

exhibiting any signs of stroke.  She had slipped out of her chair due to

weakness.  She had no head injury.  No loss of consciousness.  On admit, her

white blood cell count was 13.2, hemoglobin 10.3, platelets 202.  Sodium 139,

potassium 3.6, creatinine up to 3.0 from baseline of about 1.8, BUN elevated at

97, carbon dioxide 19, glucose 95, magnesium 1.3.  Liver function tests normal.

Albumin 3.1.

 

SUMMARY OF HOSPITAL COURSE:  The patient was given a liter of normal saline in

the emergency room.  Her blood pressure actually on admit to the emergency room

was elevated at 140/104 and not hypotensive prior to getting IV fluids.  The

patient did not require any Zofran or any Imodium.  She was able to intake food

and did not throw up or have any emesis.  She had good urine output.  By next

morning, she was asking to go back to the nursing home.  She had also received

lactated Ringer.  She was placed on heparin for DVT prophylaxis.  Her lab work

next morning showed her white blood cell count improved to 7.7, hemoglobin 9.7,

which is stable, platelets 190.  Sodium 147, potassium 3.6, creatinine 1.6, BUN

improved to 56, glucose 76.  Her blood pressure in the morning was 107/47, pulse

was 56, respiratory rate was 19, saturations were 90%.  She was alert,

talkative.  The patient was felt to be back at her baseline.

 

MEDICATIONS:  At the time of discharge will be the same as admission of:

1. Prilosec 20 mg 1 pill daily.

2. Azopt eye drops 1 drop twice a day, left eye.

3. Lotensin 10 mg daily.

4. Combigan 1 drop both eyes b.i.d.

5. Furosemide 60 mg daily.

6. Aspirin 81 mg daily.

7. Crestor 5 mg daily.

8. Catapres 0.1 mg b.i.d.

9. Metoprolol succinate 25 mg daily.

10.Vitamin B12 1000 mcg tablet daily.

11.Vitamin D3 2000 units daily.

12.Acetaminophen 500 mg 3 times a day.

13.Zofran 4 mg every 6 hours as needed for nausea.

14.Triamcinolone 0.1% cream twice a day as needed.

15.Multivitamin 1 pill daily.

16.Levothyroxine 25 mcg 1 pill daily.

17.Imodium 4 mg as needed for loose stools 1 to 2 pills.

18.Bisacodyl 5 mg daily p.r.n. constipation.

19.Docusate 100 mg 1 pill daily p.r.n. constipation.

20.Tylenol Extra Strength 500 mg q.8 hours p.r.n.

21.Zoloft 12.5 mg daily.

22.Calcium carbonate 500 mg 1 pill 4 times a day as needed for heartburn.

 

 

[id number] 08:27:10  MODL:  2020 13:17:21

Job #:  [id number]

## 2020-03-06 NOTE — PN
Progress Note for BASSAM FAIR  Date:  2020  Room #:  VM.215

 

SUBJECTIVE:  The patient was admitted yesterday with nausea, vomiting with low

blood pressure, elevated creatinine level.  She had a recent bout of acute

gastroenteritis at the nursing home and she has had very labile blood pressure

readings.  Yesterday, it was noted that her creatinine had gone up to 3.0, so

she was admitted to acute care, given IV hydration.  She comments she has not

had a great appetite since she has been here, but she has not had any evidence

of vomiting or diarrhea.  Her son, Ramirez, was present in the room with her as

well.

 

OBJECTIVE:  Vital Signs:  Her temperature is 36.4, pulse 56, blood pressure is

107/47, respiratory rate is 19, saturations are 90%.  They had been 98% last

night.

General:  The patient appears slightly chilled.  She is alert, asking to go back

to the nursing home.

Heart:  Regular rate and rhythm.

Lungs:  Clear to auscultation.

Abdomen:  Soft, nontender.

Lower Extremities:  No edema.

 

LABORATORY DATA:  Shows that her white blood cell count has dropped down to 7.7

from 13.2; hemoglobin 9.7 from 10.3, about her baseline; platelets are 190.

Sodium is 147, potassium 3.6, creatinine has improved to 1.6 from 3.0, BUN

changed from 97 down to 56, and glucose is 76 this morning.

 

IMPRESSION:

1. Acute kidney injury secondary to dehydration.

2. Chronic kidney disease.

3. Viral syndrome.

4. Type 2 diabetes mellitus.

5. Anemia of chronic disease.

6. Hypertension.

7. Hypercholesterolemia.

8. Cerebrovascular disease.

9. Macular degeneration.

10.Type 2 diabetes mellitus.

11.Vitamin B deficiency.

12.Depression.

 

PLAN:  We will saline lock the patient's IV right now.  We will see how she does

with breakfast.  If this goes well, then she

will be transferred back to the [facility] on her same medications and she can

have her heparin discontinued as well.

 

 

[id number] 08:18:31  MODL:  2020 08:49:24

Job #:  [id number]